# Patient Record
Sex: FEMALE | Race: WHITE | NOT HISPANIC OR LATINO | Employment: FULL TIME | URBAN - NONMETROPOLITAN AREA
[De-identification: names, ages, dates, MRNs, and addresses within clinical notes are randomized per-mention and may not be internally consistent; named-entity substitution may affect disease eponyms.]

---

## 2020-08-21 PROBLEM — K60.30 ANAL FISTULA: Status: ACTIVE | Noted: 2020-08-21

## 2022-03-30 PROBLEM — K60.30 ANAL FISTULA: Status: RESOLVED | Noted: 2020-08-21 | Resolved: 2022-03-30

## 2022-12-12 DIAGNOSIS — Z30.41 ENCOUNTER FOR SURVEILLANCE OF CONTRACEPTIVE PILLS: Primary | ICD-10-CM

## 2022-12-12 RX ORDER — ACETAMINOPHEN AND CODEINE PHOSPHATE 120; 12 MG/5ML; MG/5ML
1 SOLUTION ORAL DAILY
Qty: 90 TABLET | Refills: 3 | Status: SHIPPED | OUTPATIENT
Start: 2022-12-12

## 2022-12-12 RX ORDER — NORGESTIMATE AND ETHINYL ESTRADIOL 0.25-0.035
1 KIT ORAL DAILY
Qty: 90 TABLET | Refills: 3 | Status: SHIPPED | OUTPATIENT
Start: 2022-12-12 | End: 2022-12-12

## 2023-07-05 ENCOUNTER — HOSPITAL ENCOUNTER (OUTPATIENT)
Dept: RADIOLOGY | Facility: CLINIC | Age: 33
Discharge: HOME | End: 2023-07-05
Payer: COMMERCIAL

## 2023-07-05 ENCOUNTER — OFFICE VISIT (OUTPATIENT)
Dept: OBGYN CLINIC | Facility: CLINIC | Age: 33
End: 2023-07-05
Payer: COMMERCIAL

## 2023-07-05 VITALS
HEART RATE: 76 BPM | TEMPERATURE: 97.8 F | WEIGHT: 188 LBS | HEIGHT: 65 IN | SYSTOLIC BLOOD PRESSURE: 126 MMHG | BODY MASS INDEX: 31.32 KG/M2 | DIASTOLIC BLOOD PRESSURE: 78 MMHG

## 2023-07-05 DIAGNOSIS — S82.035A CLOSED NONDISPLACED TRANSVERSE FRACTURE OF LEFT PATELLA, INITIAL ENCOUNTER: ICD-10-CM

## 2023-07-05 DIAGNOSIS — M25.562 ACUTE PAIN OF LEFT KNEE: ICD-10-CM

## 2023-07-05 DIAGNOSIS — M25.562 ACUTE PAIN OF LEFT KNEE: Primary | ICD-10-CM

## 2023-07-05 PROCEDURE — 99204 OFFICE O/P NEW MOD 45 MIN: CPT | Performed by: ORTHOPAEDIC SURGERY

## 2023-07-05 PROCEDURE — 73560 X-RAY EXAM OF KNEE 1 OR 2: CPT

## 2023-07-05 PROCEDURE — 27520 TREAT KNEECAP FRACTURE: CPT | Performed by: ORTHOPAEDIC SURGERY

## 2023-07-05 NOTE — PROGRESS NOTES
ASSESSMENT/PLAN:    Diagnoses and all orders for this visit:    Acute pain of left knee  -     XR knee 1 or 2 vw left; Future    Closed nondisplaced transverse fracture of left patella, initial encounter  -     T-Rom  Post Op Knee Brace        Plan: A T ROM brace was provided allowing her approximately 30 degrees of flexion. She is permitted to bear weight but the brace should remain in place at all times. She was provided with a note for work limiting her activities. I will see her in 2 weeks for reevaluation with AP and lateral x-rays of her left knee obtained out of the brace. If the fracture remains nondisplaced and early healing is noted, slightly greater range of motion may be permitted and she may be able to come out of the brace for brief periods of time, such as cleansing. However, she does understand that removing the brace might increase the risk of fracture displacement, especially if she will fall. Instructions were provided, questions answered and precautions reviewed. Return in about 2 weeks (around 7/19/2023). _____________________________________________________  CHIEF COMPLAINT:  Chief Complaint   Patient presents with   • Left Knee - Fracture         SUBJECTIVE:  Kulwinder Eduardo is a 35y.o. year old female who presents for evaluation of her left knee injured when she fell getting out of the bathtub on 6/29/2023. She describes her left leg slipping and she fell landing on her left knee in a kneeling type position. She was seen in the emergency room at [facility], x-rays were obtained and she was placed into a knee immobilizer. She has removed the knee immobilizer for sleeping and for cleansing. She will keep the knee immobilizer off when she is initially getting ready for the days activities in the morning and when getting ready for bed. She notes pain if she tries to flex the knee. She denies any history of prior injuries to the left knee.   She denies additional injuries and denies paresthesias. She complains of anterior knee pain. PAST MEDICAL HISTORY:  Past Medical History:   Diagnosis Date   • Arthritis    • Borderline high cholesterol    • Collagen disorder (HCC)     arthritis   • CTS (carpal tunnel syndrome)    • GERD (gastroesophageal reflux disease)    • IBS (irritable bowel syndrome)    • Urinary tract infection    • Varicella        PAST SURGICAL HISTORY:  Past Surgical History:   Procedure Laterality Date   • ADENOIDECTOMY     • ANAL FISTULOTOMY  2021   • COLONOSCOPY     • HEMORRHOID SURGERY  10/30/2020   • [place]  DELIVERY ONLY N/A 10/4/2022    Procedure:  SECTION ();   Surgeon: Jennifer Cruz DO;  Location: [facility];  Service: Obstetrics   • UPPER GASTROINTESTINAL ENDOSCOPY     • WISDOM TOOTH EXTRACTION         FAMILY HISTORY:  Family History   Problem Relation Age of Onset   • Ulcerative colitis Paternal Grandmother    • Diverticulitis Paternal Grandmother    • Colon polyps Maternal Aunt    • Colon cancer Other    • Supraventricular tachycardia Mother    • Other Mother         trali antibody   • Hyperlipidemia Father    • Arthritis Father    • Hypertension Father    • Hypertension Maternal Grandmother    • Heart attack Maternal Grandfather    • Colonic polyp Maternal Grandfather    • Liver disease Maternal Grandfather    • Heart disease Maternal Grandfather    • Dementia Paternal Grandfather    • No Known Problems Son    • Breast cancer Neg Hx    • Ovarian cancer Neg Hx        SOCIAL HISTORY:  Social History     Tobacco Use   • Smoking status: Former     Packs/day: 1.00     Years: 0.00     Total pack years: 0.00     Types: Cigarettes     Quit date: 3/10/2019     Years since quittin.3   • Smokeless tobacco: Never   Vaping Use   • Vaping Use: Never used   Substance Use Topics   • Alcohol use: Yes     Comment: socially prior to knowledge of pregnancy   • Drug use: Never       MEDICATIONS:    Current Outpatient Medications:   •  norethindrone (Jill) 0.35 MG tablet, Take 1 tablet (0.35 mg total) by mouth daily, Disp: 90 tablet, Rfl: 3  •  etonogestrel-ethinyl estradiol (NuvaRing) 0.12-0.015 MG/24HR vaginal ring, Insert vaginally and leave in place for 3 consecutive weeks, then remove for 1 week. (Patient not taking: Reported on 7/5/2023), Disp: 3 each, Rfl: 3  •  Prenatal Vit-Fe Fumarate-FA (PRENATAL VITAMIN PO), Take by mouth (Patient not taking: Reported on 7/5/2023), Disp: , Rfl:     ALLERGIES:  Allergies   Allergen Reactions   • Pollen Extract Swelling     Tree Pollen-Watery eyes, swollen glands       Review of systems:   Constitutional: Negative for fatigue, fever or loss of apetite. HENT: Negative. Respiratory: Negative for shortness of breath, dyspnea. Cardiovascular: Negative for chest pain/tightness. Gastrointestinal: Negative for abdominal pain, N/V. Endocrine: Negative for cold/heat intolerance, unexplained weight loss/gain. Genitourinary: Negative for flank pain, dysuria, hematuria. Musculoskeletal: Positive as in the HPI  Skin: Negative for rash. Neurological: Negative  Psychiatric/Behavioral: Negative for agitation. _____________________________________________________  PHYSICAL EXAMINATION:    Blood pressure 126/78, pulse 76, temperature 97.8 °F (36.6 °C), temperature source Temporal, height 5' 5" (1.651 m), weight 85.3 kg (188 lb), last menstrual period 05/24/2023, not currently breastfeeding. General: well developed and well nourished, alert, oriented times 3 and appears comfortable  Psychiatric: Normal  HEENT: Benign  Cardiovascular: Regular    Pulmonary: No wheezing or stridor  Abdomen: Soft, Nontender  Skin: No masses, erythema, lacerations, fluctation, ulcerations  Neurovascular: Motor and sensory exams are grossly intact excluding as is limited by her injury. Pulses are palpable. MUSCULOSKELETAL EXAMINATION:    Lower extremity exam demonstrates knee immobilizer in place. She has good range of motion of the hip without complaints, likewise, she has good range of motion of the ankle and foot without complaints. The immobilizer was removed. There is resolving ecchymosis noted about the anterior knee and tenderness to palpation of the distal portion of the patella. She denied tenderness with palpation more proximally along the mid to proximal portion of the patella and with medial/lateral compression of the patella. The patella tendon and tibial tubercle were nontender. There is a mild effusion noted. She was able to be flexed to approximately 45 degrees with pain imaging further flexion. Collateral ligaments are stable. Meniscal examination could not be completed due to the patient's symptoms. _____________________________________________________  STUDIES REVIEWED:  X-rays of the knee dated 6/29/2023 demonstrate a distal pole patella fracture seen only on the lateral image without significant displacement. Repeat x-rays obtained today including AP and lateral views of the knee demonstrate the fracture to remain in acceptable position without change from the original images. The x-ray report of 6/29/2023 was reviewed. The ER note was reviewed. PROCEDURES:  Fracture / Dislocation Treatment    Date/Time: 7/5/2023 5:00 PM    Performed by: Reba Mortimer  Authorized by: Reba Mortimer    Patient Location:  [facility]  [place] Protocol:  Consent: Verbal consent obtained. Written consent not obtained. Risks and benefits: risks, benefits and alternatives were discussed  Consent given by: patient  Patient understanding: patient states understanding of the procedure being performed  Test results: test results available and properly labeled  Radiology Images displayed and confirmed.  If images not available, report reviewed: imaging studies available      Injury location:  Knee  Location details:  Left knee  Injury type:  Fracture  Fracture type: patellar    Neurovascular status: Neurovascularly intact    Local anesthesia used?: No    General anesthesia used?: No    Manipulation performed?: No    Immobilization:  Brace (T ROM)  Patient tolerance:  Patient tolerated the procedure well with no immediate complications          Nile Maldonado

## 2023-07-05 NOTE — LETTER
July 5, 2023     Patient: Meg Delgado  YOB: 1990  Date of Visit: 7/5/2023      To Whom it May Concern:    Meg Delgado is under my professional care. Bladimir Samaniego was seen in my office on 7/5/2023. Bladimir Samanieog may return to work on 7/5/2023. She is permitted to work but must wear the knee brace at all times. She is not permitted to lift more than 10 pounds. She is permitted to perform routine clerical duties . If you have any questions or concerns, please don't hesitate to call.          Sincerely,          Ariadne Alvarez        CC: Cheryle Harris

## 2023-07-10 ENCOUNTER — TELEPHONE (OUTPATIENT)
Dept: OBGYN CLINIC | Facility: HOSPITAL | Age: 33
End: 2023-07-10

## 2023-07-10 NOTE — TELEPHONE ENCOUNTER
Caller: patient  Doctor: Drea Sol    Reason for call: has pain from brace scraping skin, asking if she can cushion it with something?     Call back#: [phone]

## 2023-07-14 NOTE — TELEPHONE ENCOUNTER
Patient came into the office to have straps adjusted. She said that she had to take brace off to put a Band-Aid on an area that brace was rubbing on. She also put knee sleeve on to prevent rubbing. Brace adjusted and put back on without knee sleeve  I made sure that patient was comfortable with straps and padding before she left the office.

## 2023-07-18 NOTE — TELEPHONE ENCOUNTER
Another message received from patient about brace. Duarte Officer came to office today to check brace. She exchanged brace with a new one and added some moleskin to strap that was irritating patients skin.

## 2023-07-20 ENCOUNTER — OFFICE VISIT (OUTPATIENT)
Dept: OBGYN CLINIC | Facility: CLINIC | Age: 33
End: 2023-07-20

## 2023-07-20 ENCOUNTER — HOSPITAL ENCOUNTER (OUTPATIENT)
Dept: RADIOLOGY | Facility: CLINIC | Age: 33
End: 2023-07-20
Payer: COMMERCIAL

## 2023-07-20 VITALS
WEIGHT: 190 LBS | HEART RATE: 73 BPM | SYSTOLIC BLOOD PRESSURE: 122 MMHG | BODY MASS INDEX: 31.65 KG/M2 | DIASTOLIC BLOOD PRESSURE: 80 MMHG | TEMPERATURE: 98.2 F | HEIGHT: 65 IN

## 2023-07-20 DIAGNOSIS — S82.035D CLOSED NONDISPLACED TRANSVERSE FRACTURE OF LEFT PATELLA WITH ROUTINE HEALING, SUBSEQUENT ENCOUNTER: ICD-10-CM

## 2023-07-20 DIAGNOSIS — S82.035D CLOSED NONDISPLACED TRANSVERSE FRACTURE OF LEFT PATELLA WITH ROUTINE HEALING, SUBSEQUENT ENCOUNTER: Primary | ICD-10-CM

## 2023-07-20 PROCEDURE — 73562 X-RAY EXAM OF KNEE 3: CPT

## 2023-07-20 PROCEDURE — 99024 POSTOP FOLLOW-UP VISIT: CPT | Performed by: ORTHOPAEDIC SURGERY

## 2023-07-20 NOTE — PROGRESS NOTES
Patient Name:  Alejandra Malik  MRN:  [id number]    Assessment     1. Closed nondisplaced transverse fracture of left patella with routine healing, subsequent encounter  XR knee 3 vw left non injury        Plan   Patient will continue in the TROM brace. It is to be locked in extension for weightbearing. She can unlock it to work on range of motion 0 to 40 degrees. Reviewed how to shower getting in with brace on and then removing, turning water on/showering, drying and reapplying brace prior to exiting the shower. Patient will be seen back in 3 weeks with repeat AP and lateral nonweightbearing x-rays out of brace and likely start physical therapy at that point in time. She was shown some straight leg raises with straight leg extension, and abduction/adduction exercises that can be done in the brace locked in extension. She is also to work on gentle range of motion when 0 and 40 degrees in brace at home. Ice as needed for discomfort. Return in about 3 weeks (around 8/10/2023) for AP and lateral x-ray. Subjective   Alejandra Malik returns for follow-up of left nondisplaced transverse fracture inferior pole patella with DOI 6/29/2023 while at home getting out of the bathtub. The patient is 3 week(s) post injury and returns for routine follow-up. Patient denies major pain about the knee. He feels better with less brace irritation and the new brace that she got fitted by and that. She does note slight soreness if weightbearing for extended periods of time. She does note that she gets some pain way she attempts to ambulate with the brace not locked in extension. Objective     /80 (BP Location: Left arm)   Pulse 73   Temp 98.2 °F (36.8 °C) (Temporal)   Ht 5' 5" (1.651 m)   Wt 86.2 kg (190 lb)   LMP 05/24/2023 (Exact Date)   BMI 31.62 kg/m²     Left knee is without ecchymosis and only minimal effusion.   Very minimal tenderness with palpation at the fracture site on the inferior patella. Brace was locked in full extension with settings at 0 and 0 other than 0-30. She had pain-free flexion to 40 degrees and therefore the brace was set at 0-40. There is no calf pain. There is a small half a centimeter round area of very superficial abrasion in the brace strap that is not infected and healing nicely. Light touch sensation is intact. Data Review     I have personally reviewed pertinent films in PACS documenting notes contained position alignment of the nondisplaced inferior pole patella fracture.     [place] [employer]

## 2023-07-20 NOTE — PATIENT INSTRUCTIONS
Patient will continue in the TROM brace. It is to be locked in extension for weightbearing. She can unlock it to work on range of motion 0 to 40 degrees. Reviewed how to shower getting in with brace on and then removing, turning water on/showering, drying and reapplying brace prior to exiting the shower. Patient will be seen back in 3 weeks with repeat x-rays and likely start physical therapy at that point in time. She was shown some straight leg raises with straight leg extension, and abduction/adduction exercises that can be done in the brace locked in extension. She is also to work on gentle range of motion when 0 and 40 degrees in brace at home. Ice as needed for discomfort.

## 2023-07-28 ENCOUNTER — OFFICE VISIT (OUTPATIENT)
Dept: OBGYN CLINIC | Facility: CLINIC | Age: 33
End: 2023-07-28

## 2023-07-28 VITALS
SYSTOLIC BLOOD PRESSURE: 120 MMHG | HEIGHT: 65 IN | BODY MASS INDEX: 31.65 KG/M2 | WEIGHT: 190 LBS | HEART RATE: 110 BPM | TEMPERATURE: 98.6 F | DIASTOLIC BLOOD PRESSURE: 80 MMHG

## 2023-07-28 DIAGNOSIS — S82.035D CLOSED NONDISPLACED TRANSVERSE FRACTURE OF LEFT PATELLA WITH ROUTINE HEALING, SUBSEQUENT ENCOUNTER: Primary | ICD-10-CM

## 2023-07-28 PROCEDURE — 99024 POSTOP FOLLOW-UP VISIT: CPT | Performed by: ORTHOPAEDIC SURGERY

## 2023-07-28 NOTE — PATIENT INSTRUCTIONS
Patient will follow-up at her previously scheduled appointment. Continue 0 to 40 degrees range of motion and bracing as previous. Ice for any pain or swelling.

## 2023-07-28 NOTE — PROGRESS NOTES
Patient Name:  Ventura Aguilar  MRN:  [id number]    Assessment     1. Closed nondisplaced transverse fracture of left patella with routine healing, subsequent encounter          Plan     1. Patient will follow-up at her previously scheduled appointment. Continue 0 to 40 degrees range of motion and bracing as previous. Ice for any pain or swelling. Return Keep your previously scheduled follow-up. Subjective   [place] Selina Gonzales returns because she thought she saw a dimple change in her knee this morning slightly more pain. She did not fall. She was at the [employer] last evening. You had a  she worked this morning. Denies numbness or tingling. She notes that her contralateral hip is irritating little bit to the way she is walking. Objective     /80 (BP Location: Left arm)   Pulse (!) 110   Temp 98.6 °F (37 °C) (Temporal)   Ht 5' 5" (1.651 m)   Wt 86.2 kg (190 lb)   BMI 31.62 kg/m²     Left knee doubt any obvious deformity there is trace swelling still present over the medial aspect. No ecchymosis. No change in position of the patella and no obvious patellar step-off nor Sessa pain to palpation. Touch sensation intact. On adjusting the brace patient is able to do a straight leg raise remove the brace from underneath the leg without pain. Patient was walking stiff legged in the office today. Brace was adjusted and ankle notations calibrated following the patient to have 0 to 40 degrees which she is able to tolerate and notes better mobility. Data Review     No pertinent data or x-rays for review.     Tamy Owens PA-C

## 2023-07-28 NOTE — PROGRESS NOTES
Patient Name:  Cranston Moritz  MRN:  [id number]    Assessment     No diagnosis found. Plan     1. ***    No follow-ups on file. Subjective   Cranston Moritz returns for follow-up of left nondisplaced transverse fracture of inferior pole of patella DOI 6/29/2023. The patient is 4 week(s) post injury and returns for routine follow-up. She was last seen in regards to this issue on 7/20/2023, at which time she was instructed to continue with a T ROM knee brace. She can unlock it and practice range of motion exercises between 0 and 40 degrees. She was also educated in appropriate removing and replacing techniques of the brace for hygiene purposes, and four-way hip exercises to be performed in the brace. On today's presentation patient {Denies/complains:58366}      Objective     /80 (BP Location: Left arm)   Pulse (!) 110   Temp 98.6 °F (37 °C) (Temporal)   Ht 5' 5" (1.651 m)   Wt 86.2 kg (190 lb)   BMI 31.62 kg/m²     Left knee -         Data Review     I have personally reviewed pertinent films in PACS, and my interpretation follows.     .  Scribe Attestation    I,:  Valencia Burris am acting as a scribe while in the presence of the attending physician.:       I,:  Katie Swartz personally performed the services described in this documentation    as scribed in my presence.:

## 2023-08-02 ENCOUNTER — PATIENT MESSAGE (OUTPATIENT)
Dept: OBGYN CLINIC | Facility: MEDICAL CENTER | Age: 33
End: 2023-08-02

## 2023-08-02 DIAGNOSIS — Z30.011 ENCOUNTER FOR INITIAL PRESCRIPTION OF CONTRACEPTIVE PILLS: Primary | ICD-10-CM

## 2023-08-06 RX ORDER — NORGESTIMATE AND ETHINYL ESTRADIOL 0.25-0.035
1 KIT ORAL DAILY
Qty: 90 TABLET | Refills: 3 | Status: SHIPPED | OUTPATIENT
Start: 2023-08-06

## 2023-08-10 ENCOUNTER — OFFICE VISIT (OUTPATIENT)
Dept: OBGYN CLINIC | Facility: CLINIC | Age: 33
End: 2023-08-10

## 2023-08-10 ENCOUNTER — HOSPITAL ENCOUNTER (OUTPATIENT)
Dept: RADIOLOGY | Facility: CLINIC | Age: 33
Discharge: HOME | End: 2023-08-10
Payer: COMMERCIAL

## 2023-08-10 VITALS
SYSTOLIC BLOOD PRESSURE: 110 MMHG | WEIGHT: 190 LBS | HEIGHT: 65 IN | TEMPERATURE: 98 F | HEART RATE: 80 BPM | DIASTOLIC BLOOD PRESSURE: 72 MMHG | BODY MASS INDEX: 31.65 KG/M2

## 2023-08-10 DIAGNOSIS — S82.035D CLOSED NONDISPLACED TRANSVERSE FRACTURE OF LEFT PATELLA WITH ROUTINE HEALING, SUBSEQUENT ENCOUNTER: Primary | ICD-10-CM

## 2023-08-10 DIAGNOSIS — S82.035D CLOSED NONDISPLACED TRANSVERSE FRACTURE OF LEFT PATELLA WITH ROUTINE HEALING, SUBSEQUENT ENCOUNTER: ICD-10-CM

## 2023-08-10 PROCEDURE — 73560 X-RAY EXAM OF KNEE 1 OR 2: CPT

## 2023-08-10 PROCEDURE — 99024 POSTOP FOLLOW-UP VISIT: CPT | Performed by: ORTHOPAEDIC SURGERY

## 2023-08-10 NOTE — PROGRESS NOTES
Patient Name:  Alcides Brown  MRN:  [id number]    Assessment     1. Closed nondisplaced transverse fracture of left patella with routine healing, subsequent encounter  XR knee 1 or 2 vw left    Ambulatory Referral to Physical Therapy          Plan     1. The range of motion brace was increased to allow 0 to 90 degrees. She may remove the brace for inactivity, sleeping and cleansing. The option of physical therapy was discussed and she does believe that therapy would be in her best interest.  A prescription was provided for her to attend physical therapy for the next 3 weeks and I will see her in 3 weeks for reevaluation. X-rays will be obtained only if clinically indicated. Pending her physical therapy progress, further physical therapy will be ordered if need be. I have encouraged her to contact me if questions or concerns arise. Return in about 3 weeks (around 8/31/2023). Subjective   Alcides Brown returns for follow-up of her left patella fracture. The patient is 6 week(s) post injury and returns for routine follow-up. Patient denies complaints at this time. She has been tolerating the brace. Objective     /72 (BP Location: Left arm)   Pulse 80   Temp 98 °F (36.7 °C) (Temporal)   Ht 5' 5" (1.651 m)   Wt 86.2 kg (190 lb)   BMI 31.62 kg/m²     Exam demonstrates the brace in place. She has no tenderness to palpation of the patella. She does have excellent strength of resisted extension but did complain of minor pain at the distal pole of the left patella. There is no significant swelling noted. The skin is intact. Distal pulses are palpable. Data Review     I have personally reviewed pertinent films in PACS demonstrating excellent progression of healing with the fracture being barely visible, especially in comparison to images of 7/20/2023.     [place]

## 2023-08-11 NOTE — PROGRESS NOTES
PT Evaluation     Today's date: 2023  Patient name: Kizzy López  : 1990  MRN: [id number]  Referring provider: Shari Burr DO  Dx:   Encounter Diagnosis     ICD-10-CM    1. Closed nondisplaced transverse fracture of left patella with routine healing, subsequent encounter  S82.035D                      Assessment  Assessment details: The patient is a 34 y/o female who presents to PT with diagnosis of closed L patella fracture. She has complaints of intermittent knee pain. She demonstrates deficits with decreased ROM and strength, decreased flexibility, antalgic gait with use of brace, decreased balance and proprioception, difficulty with stair negotiation, muscle atrophy and pain with completing her ADLs and tasks at home. She is ambulating without AD but is using brace open from 0-90*. She ambulates with slow dolores and decreased weight shift to LLE in stance phase. She has more difficulty with walking on uneven surfaces vs even surfaces and also has complaints of knee instability at times. She has difficulty with stair negotiation and has been doing them with non-reciprocal gait pattern. Muscle atrophy is noted in quad and calf L vs R LE. Secondary to pain and above deficits she is limited with her overall mobility and function. The patient would benefit from continued PT to address deficits and improve function. Tx to include ROM, stretching, strengthening, modalities, HEP, pt education, postural ed, lifting/body mechanics, neuro re-ed, balance/proprioception Te, MT and equipment. Impairments: abnormal gait, abnormal or restricted ROM, activity intolerance, impaired balance, impaired physical strength, lacks appropriate home exercise program, pain with function and weight-bearing intolerance  Other impairment: decreased flexibility  Functional limitations: difficulty with stair negotiationUnderstanding of Dx/Px/POC: good   Prognosis: good    Goals  STGs:  1.   Initiate and complete HEP with verbal cues. 2.  Decrease L knee pain by > 25% in 4 weeks. 3.  Improve L knee ROM by 15-20 degrees in 4 weeks. 4.  Improve LLE strength by 1/2-1 grade in 4 weeks. LTGs:  1. Patient to be I with HEP in 8 weeks. 2. Improve L knee ROM to 0-140 degrees in 8 weeks to improve function. 3. Improve LLE strength to 4+ to 5/5 t/o in 8 weeks to improve function. 4. Decrease L knee pain to < or = to 1-2/10 with activity in 8 weeks to improve function. 5.  Patient to ambulate with normalized gait pattern without brace in 8 weeks. 6.  Stair negotiation is improved to PLOF in 8 weeks. 7.  Recreational performance is improved to PLOF in 8 weeks. 8.  ADL performance is improved to PLOF in 8 weeks. 9.  Work performance is improved to maximal level of function in 8 weeks. 10.  Decrease edema  L knee by > 1-2 cm in 8 weeks to help improve flexibility. 11.  Patient to report no knee instability in 8 weeks. Plan  Plan details: Modalities and therapy interventions prn. Patient would benefit from: skilled physical therapy  Planned modality interventions: cryotherapy and thermotherapy: hydrocollator packs  Planned therapy interventions: IASTM, manual therapy, balance, balance/weight bearing training, neuromuscular re-education, patient education, postural training, self care, strengthening, therapeutic activities, stretching, therapeutic exercise, gait training, functional ROM exercises, flexibility and home exercise program  Frequency: 2x week  Duration in weeks: 8  Plan of Care beginning date: 8/16/2023  Plan of Care expiration date: 10/11/2023  Treatment plan discussed with: patient        Subjective Evaluation    History of Present Illness  Date of onset: 6/29/2023  Mechanism of injury: trauma  Mechanism of injury: The patient states that on 6/29 she was in her bathroom and slipped on water. She had landed on her L knee cap when she fell.   She notes that she had pain in her knee and was limping. She had gone to [facility] [place] [facility]. She had x-rays taken, she had a fracture of the patella. She was given an immobilizer to wear. She was told to follow up with the orthopedic doctor. She had seen Dr. Adonay Morrow on 23. She was taken out of the immobilizer and given a hinged knee brace. She was told that she could open it to 40* but she had kept it locked because her knee felt unstable. She was back to see the doctor on , had more x-rays taken. She had seen Dr. Adonay Morrow on 8/10 for her most recent visit and was referred to OPPT. She had more x-rays taken, per patient it is healing. She was able to open her brace to 90*. She will be going back to see him on  for her follow up appointment. She notes that her knee is stiff. Knee continues to feel unstable, especially with the steps and uneven surfaces. She notes that she has pain in her knee and along her posterior knee (hamstring tendons). Also with in her knee when straightening her knee. She notes that she does get intermittent swelling in her knee. From EMR Review of knee x-rays in Dr. Adonay Morrow note:  I have personally reviewed pertinent films in PACS demonstrating excellent progression of healing with the fracture being barely visible, especially in comparison to images of 2023. Patient Goals  Patient goals for therapy: increased motion, improved balance, decreased pain, increased strength, decreased edema, return to work and return to sport/leisure activities  Patient goal: "To get my knee stronger, to feel confident when lifting at work."    Pain  At best pain ratin  At worst pain ratin  Location: L Knee  Quality: dull ache and discomfort  Aggravating factors: stair climbing and walking    Social Support  Stairs in house: yes   Patient lives at: [facility].   Lives with: young children and spouse    Employment status: working (Working full time -  )    Diagnostic Tests  Abnormal x-ray: patella is healing         Objective     Observations     Additional Observation Details  Muscle atrophy noted in L quad and calf vs RLE. Tenderness   Left Knee   Tenderness in the inferior patella and patellar tendon. No tenderness in the lateral patella, medial patella and quadriceps tendon. Neurological Testing     Sensation     Knee   Left Knee   Intact: light touch    Right Knee   Intact: light touch     Active Range of Motion   Left Knee   Flexion: 90 (Able to bend to 105 with pulling) degrees   Extension: 0 degrees     Right Knee   Flexion: 140 degrees   Extension: 0 degrees     Mobility   Patellar Mobility:   Left Knee   WFL: medial and lateral.     Strength/Myotome Testing     Left Knee   Flexion: 3-  Extension: 3-  Quadriceps contraction: fair    Right Knee   Flexion: WFL  Extension: WFL  Quadriceps contraction: good    Swelling     Left Knee Girth Measurement (cm)   Joint line: 40 cm    Right Knee Girth Measurement (cm)   Joint line: 38.5 cm    Ambulation   Weight-Bearing Status   Weight-Bearing Status (Left): weight-bearing as tolerated     Additional Weight-Bearing Status Details  Using hinged knee brace open 0-90* for activity. Ambulation: Level Surfaces   Ambulation without assistive device: independent    Ambulation: Stairs   Ascend stairs: independent  Pattern: non-reciprocal  Descend stairs: independent  Pattern: non-reciprocal    Observational Gait   Decreased walking speed and left stance time.                  Precautions: Knee ROM 0-90 degrees, Arthritis       Manuals 8/16       L Knee        LLE                        Neuro Re-Ed         BOSU Lunges        SLS        Tandem Stance        Sidestepping        Monster Walks                        Ther Ex        NuStep vs Bike        HR/TR HEP - HR       SLR x 3        TKE w/TBand        LAQ HEP       QSets HEP       SAQ        Bridges        SLR HEP       Heel Slides to 90*        S/L Hip Abd HEP       Clamshells        Prone Hip Ext Ther Activity        Stepups F/L HEP       Stepdowns        STS HEP       Gait Training                        Modalities        CP prn                           Access Code: [email or web address]  URL: [email or web address]  Date: 08/16/2023  Prepared by: [address] [place] [address] Sitting Quad Set  - 1 x daily - 7 x weekly - 2 sets - 10 reps - 3-5" hold  - Supine Active Straight Leg Raise  - 1 x daily - 7 x weekly - 2 sets - 10 reps  - Sidelying Hip Abduction  - 1 x daily - 7 x weekly - 2 sets - 10 reps  - Seated Long Arc Quad  - 1 x daily - 7 x weekly - 2 sets - 10 reps - 3"  hold  - Standing Heel Raise with Support  - 1 x daily - 7 x weekly - 2 sets - 10 reps  - Sit to Stand  - 1 x daily - 7 x weekly - 1 sets - 10 reps  - Forward Step Up  - 1 x daily - 7 x weekly - 1 sets - 10 reps

## 2023-08-16 ENCOUNTER — EVALUATION (OUTPATIENT)
Dept: PHYSICAL THERAPY | Facility: CLINIC | Age: 33
End: 2023-08-16
Payer: COMMERCIAL

## 2023-08-16 DIAGNOSIS — S82.035D CLOSED NONDISPLACED TRANSVERSE FRACTURE OF LEFT PATELLA WITH ROUTINE HEALING, SUBSEQUENT ENCOUNTER: Primary | ICD-10-CM

## 2023-08-16 PROCEDURE — 97161 PT EVAL LOW COMPLEX 20 MIN: CPT | Performed by: PHYSICAL THERAPIST

## 2023-08-16 PROCEDURE — 97535 SELF CARE MNGMENT TRAINING: CPT | Performed by: PHYSICAL THERAPIST

## 2023-08-19 NOTE — PROGRESS NOTES
Daily Note     Today's date: 2023  Patient name: Alysa Vaughan  : 1990  MRN: [id number]  Referring provider: Amisha Ziegler DO  Dx:   Encounter Diagnosis     ICD-10-CM    1. Closed nondisplaced transverse fracture of left patella with routine healing, subsequent encounter  S82.035D       2. Gait abnormality  R26.9                      Subjective:  Patient reports she is compliant with HEP but feels the left thigh is very weak with difficulty with standing, walking and transfers. Objective: See treatment diary below      Assessment: Tolerated treatment well. PT notes start of POC with focus on gait/balance and strengthening to decrease symptoms and improve functional limitations to meet therapy goals. PT notes continuation of decrease ROM and strength t/o the left knee and LE with demonstration of impaired gait pattern and balance s/p closed patellar fracture with need for continuation of skilled therapy. Plan: Continue per plan of care.         Precautions: Knee ROM 0-90 degrees, Arthritis       Manuals       L Knee  5 min       LLE  5 min                       Neuro Re-Ed         BOSU Lunges  F/L Lunge   2x10 Each Bilat LE       SLS        Tandem Stance        Sidestepping        Monster Walks                        Ther Ex        NuStep vs Bike  SRC   5 min   Half Rolls       HR/TR HEP - HR       SLR x 3  2x10 Each left Only       TKE w/TBand        LAQ HEP       QSets HEP       SAQ        Bridges        SLR HEP       Heel Slides to 90*        S/L Hip Abd HEP       Clamshells        Prone Hip Ext  Prone TKE   10x5" Hold               Ther Activity        Stepups F/L HEP       Stepdowns        STS HEP       Gait Training                        Modalities        CP prn  Declined

## 2023-08-21 ENCOUNTER — OFFICE VISIT (OUTPATIENT)
Dept: PHYSICAL THERAPY | Facility: CLINIC | Age: 33
End: 2023-08-21
Payer: COMMERCIAL

## 2023-08-21 DIAGNOSIS — S82.035D CLOSED NONDISPLACED TRANSVERSE FRACTURE OF LEFT PATELLA WITH ROUTINE HEALING, SUBSEQUENT ENCOUNTER: Primary | ICD-10-CM

## 2023-08-21 DIAGNOSIS — R26.9 GAIT ABNORMALITY: ICD-10-CM

## 2023-08-21 PROCEDURE — 97140 MANUAL THERAPY 1/> REGIONS: CPT | Performed by: PHYSICAL THERAPIST

## 2023-08-21 PROCEDURE — 97110 THERAPEUTIC EXERCISES: CPT | Performed by: PHYSICAL THERAPIST

## 2023-08-21 PROCEDURE — 97112 NEUROMUSCULAR REEDUCATION: CPT | Performed by: PHYSICAL THERAPIST

## 2023-08-22 NOTE — PROGRESS NOTES
Daily Note     Today's date: 2023  Patient name: Lore Cazares  : 1990  MRN: [id number]  Referring provider: Nena Christy DO  Dx:   Encounter Diagnosis     ICD-10-CM    1. Closed nondisplaced transverse fracture of left patella with routine healing, subsequent encounter  S82.035D       2. Gait abnormality  R26.9                      Subjective:  Patient reports minimal soreness in the left knee post last session. Patient reports the knee is still shaking and feels weak with standing and walking activities. Objective: See treatment diary below      Assessment: Tolerated treatment well. PT notes continuation of decrease ROM and strength t/o the left knee and LE with demonstration of impaired gait pattern and balance s/p left patellar fracture with need for continuation of skilled therapy. Plan: Continue per plan of care.       Precautions: Knee ROM 0-90 degrees, Arthritis       Manuals      L Knee  5 min  5 min      LLE  5 min  5 min                      Neuro Re-Ed         BOSU Lunges  F/L Lunge   2x10 Each Bilat LE  F/L Lunge   2x10 Each   Bilat LE      SLS        Tandem Stance      2 min      Sidestepping        Monster Walks                        Ther Ex        NuStep vs Bike  [facility]   5 min   Half Circles   [facility]   6 min Half Circles      HR/TR HEP - HR  On Step   2x10 Bilat   6" step      SLR x 3  2x10 Each left Only  HEP      TKE w/TBand   10x5" Hold Blue      LAQ HEP       QSets HEP       SAQ        Bridges   With ABD   2x10 Blue      SLR HEP       Heel Slides to 90*        S/L Hip Abd HEP       Clamshells        Prone Hip Ext  Prone TKE   10x5" Hold  Prone TKE 10x5" Hold              Ther Activity        Stepups F/L   2x10 Bilat LE   6" step      Stepdowns        STS HEP       Gait Training                        Modalities        CP prn  Declined  Declined

## 2023-08-23 ENCOUNTER — OFFICE VISIT (OUTPATIENT)
Dept: PHYSICAL THERAPY | Facility: CLINIC | Age: 33
End: 2023-08-23
Payer: COMMERCIAL

## 2023-08-23 DIAGNOSIS — R26.9 GAIT ABNORMALITY: ICD-10-CM

## 2023-08-23 DIAGNOSIS — S82.035D CLOSED NONDISPLACED TRANSVERSE FRACTURE OF LEFT PATELLA WITH ROUTINE HEALING, SUBSEQUENT ENCOUNTER: Primary | ICD-10-CM

## 2023-08-23 PROCEDURE — 97110 THERAPEUTIC EXERCISES: CPT | Performed by: PHYSICAL THERAPIST

## 2023-08-23 PROCEDURE — 97140 MANUAL THERAPY 1/> REGIONS: CPT | Performed by: PHYSICAL THERAPIST

## 2023-08-23 PROCEDURE — 97112 NEUROMUSCULAR REEDUCATION: CPT | Performed by: PHYSICAL THERAPIST

## 2023-08-25 NOTE — PROGRESS NOTES
Daily Note     Today's date: 2023  Patient name: Johana Haney  : 1990  MRN: [id number]  Referring provider: Yaquelin Patrick DO  Dx:   Encounter Diagnosis     ICD-10-CM    1. Closed nondisplaced transverse fracture of left patella with routine healing, subsequent encounter  S82.035D       2. Gait abnormality  R26.9                      Subjective:  Patient reports the knee is moving better but continuation of weakness and shakiness in the left knee and LE. Objective: See treatment diary below      Assessment: Tolerated treatment well. PT notes continuation of decrease ROM and strength t/o the left knee and LE with demonstration of impaired gait pattern and balance s/p left patella fracture with need for continuation of skilled therapy. Plan: Continue per plan of care.       Precautions: Knee ROM 0-90 degrees, Arthritis       Manuals     L Knee  5 min  5 min  5 min     LLE  5 min  5 min  5 min                     Neuro Re-Ed         BOSU Lunges  F/L Lunge   2x10 Each Bilat LE  F/L Lunge   2x10 Each   Bilat LE  F/L Luge   2x10 Each   Bilat LE     SLS    BOSU Squat-Round Down   2x10     Tandem Stance   BOSU March   2 min  2 min     Sidestepping    S/L BOSU Bridge  10x Bilat     Monster Walks                        Ther Ex        NuStep vs Bike  [address]   5 min   Half Circles   [address]   6 min Half Circles  SRC   8 min   Half Circles     HR/TR HEP - HR  On Step   2x10 Bilat   6" step  HEP     SLR x 3  2x10 Each left Only  HEP      TKE w/TBand   10x5" Hold Blue  NT    LAQ HEP   Leg Press   DL 95#  SL 55#   2x10 Each     QSets HEP       SAQ        Bridges   With ABD   2x10 Blue  With ABD   2x10 Blue     SLR HEP       Heel Slides to 90*        S/L Hip Abd HEP       Clamshells        Prone Hip Ext  Prone TKE   10x5" Hold  Prone TKE 10x5" Hold  HEP             Ther Activity        Stepups F/L   2x10 Bilat LE   6" step  HEP     Stepdowns        STS HEP       Gait Training Modalities        CP prn  Declined  Declined  Declined

## 2023-08-28 ENCOUNTER — OFFICE VISIT (OUTPATIENT)
Dept: PHYSICAL THERAPY | Facility: CLINIC | Age: 33
End: 2023-08-28
Payer: COMMERCIAL

## 2023-08-28 DIAGNOSIS — R26.9 GAIT ABNORMALITY: ICD-10-CM

## 2023-08-28 DIAGNOSIS — S82.035D CLOSED NONDISPLACED TRANSVERSE FRACTURE OF LEFT PATELLA WITH ROUTINE HEALING, SUBSEQUENT ENCOUNTER: Primary | ICD-10-CM

## 2023-08-28 PROCEDURE — 97112 NEUROMUSCULAR REEDUCATION: CPT | Performed by: PHYSICAL THERAPIST

## 2023-08-28 PROCEDURE — 97110 THERAPEUTIC EXERCISES: CPT | Performed by: PHYSICAL THERAPIST

## 2023-08-28 PROCEDURE — 97140 MANUAL THERAPY 1/> REGIONS: CPT | Performed by: PHYSICAL THERAPIST

## 2023-08-29 NOTE — PROGRESS NOTES
Daily Note     Today's date: 2023  Patient name: Bucky Rothman  : 1990  MRN: [id number]  Referring provider: Claudette Serna DO  Dx:   Encounter Diagnosis     ICD-10-CM    1. Closed nondisplaced transverse fracture of left patella with routine healing, subsequent encounter  S82.035D       2. Gait abnormality  R26.9                      Subjective:  Patient reports she was sore after the last session to 3/10 level in the left knee. Patient reports she has been doing more standing and walking activities the past few days and is nervous about going to [employer] today with the soreness. Patient reported feeling better post session with 1/10 soreness. Objective: See treatment diary below      Assessment: Tolerated treatment well. PT notes continuation of decrease ROM and strength t/o the left knee and LE with demonstration of impaired gait pattern and balance s/p left patella fracture with need for continuation of skilled therapy. PT notes modified treatment secondary to subjective comments but PT will continue to progress toward therapy goals and full POC as tolerated by patient. Plan: Continue per plan of care.       Precautions: Knee ROM 0-90 degrees, Arthritis       Manuals    L Knee  5 min  5 min  5 min  5 min    LLE  5 min  5 min  5 min  5 min                    Neuro Re-Ed         BOSU Lunges  F/L Lunge   2x10 Each Bilat LE  F/L Lunge   2x10 Each   Bilat LE  F/L Luge   2x10 Each   Bilat LE  2x10 Each Bilat LE    SLS    BOSU Squat-Round Down   2x10  2x10    Tandem Stance   BOSU March   2 min  2 min  3 min    Sidestepping    S/L BOSU Bridge  10x Bilat  S/L Bridge   2x10 Bilat    Monster Walks                        Ther Ex        NuStep vs Bike  [address]   5 min   Half Circles   [address]   6 min Half Circles  [address]   8 min   Half Circles  SRC   8 min   Half Circles    HR/TR HEP - HR  On Step   2x10 Bilat   6" step  HEP     SLR x 3  2x10 Each left Only  HEP TKE w/TBand   10x5" Hold Blue  NT NT   LAQ HEP   Leg Press   DL 95#  SL 55#   2x10 Each  NT   QSets HEP       SAQ        Bridges   With ABD   2x10 Blue  With ABD   2x10 Blue  With ABD  2x10 Blue    SLR HEP       Heel Slides to 90*        S/L Hip Abd HEP       Clamshells        Prone Hip Ext  Prone TKE   10x5" Hold  Prone TKE 10x5" Hold  HEP             Ther Activity        Stepups F/L   2x10 Bilat LE   6" step  HEP     Stepdowns        STS HEP       Gait Training                        Modalities        CP prn  Declined  Declined  Declined  10 min Left Knee

## 2023-08-30 ENCOUNTER — OFFICE VISIT (OUTPATIENT)
Dept: PHYSICAL THERAPY | Facility: CLINIC | Age: 33
End: 2023-08-30
Payer: COMMERCIAL

## 2023-08-30 DIAGNOSIS — R26.9 GAIT ABNORMALITY: ICD-10-CM

## 2023-08-30 DIAGNOSIS — S82.035D CLOSED NONDISPLACED TRANSVERSE FRACTURE OF LEFT PATELLA WITH ROUTINE HEALING, SUBSEQUENT ENCOUNTER: Primary | ICD-10-CM

## 2023-08-30 PROCEDURE — 97112 NEUROMUSCULAR REEDUCATION: CPT | Performed by: PHYSICAL THERAPIST

## 2023-08-30 PROCEDURE — 97140 MANUAL THERAPY 1/> REGIONS: CPT | Performed by: PHYSICAL THERAPIST

## 2023-08-30 PROCEDURE — 97110 THERAPEUTIC EXERCISES: CPT | Performed by: PHYSICAL THERAPIST

## 2023-08-31 ENCOUNTER — OFFICE VISIT (OUTPATIENT)
Dept: OBGYN CLINIC | Facility: CLINIC | Age: 33
End: 2023-08-31

## 2023-08-31 VITALS
HEART RATE: 76 BPM | BODY MASS INDEX: 31.65 KG/M2 | HEIGHT: 65 IN | SYSTOLIC BLOOD PRESSURE: 120 MMHG | DIASTOLIC BLOOD PRESSURE: 85 MMHG | TEMPERATURE: 98 F | WEIGHT: 190 LBS

## 2023-08-31 DIAGNOSIS — S82.035D CLOSED NONDISPLACED TRANSVERSE FRACTURE OF LEFT PATELLA WITH ROUTINE HEALING, SUBSEQUENT ENCOUNTER: Primary | ICD-10-CM

## 2023-08-31 PROCEDURE — 99024 POSTOP FOLLOW-UP VISIT: CPT | Performed by: ORTHOPAEDIC SURGERY

## 2023-08-31 RX ORDER — MONTELUKAST SODIUM 10 MG/1
10 TABLET ORAL
COMMUNITY
Start: 2023-08-24

## 2023-08-31 RX ORDER — CETIRIZINE HYDROCHLORIDE 10 MG/1
10 TABLET ORAL DAILY
COMMUNITY

## 2023-08-31 NOTE — LETTER
August 31, 2023     Patient: Krysta Harden   YOB: 1990   Date of Visit: 8/31/2023       To Whom It May Concern: It is my medical opinion that Krysta Harden may return to light duty immediately with the following restrictions: May be out of her brace but has a 50 pound lifting restriction for the next 4 weeks. She will be seen in 4 weeks and likely return to work full duty without restriction . If you have any questions or concerns, please don't hesitate to call.          Sincerely,        Laura Vaughn    CC: No Recipients

## 2023-08-31 NOTE — PROGRESS NOTES
ASSESSMENT/PLAN:    Diagnoses and all orders for this visit:    Closed nondisplaced transverse fracture of left patella with routine healing, subsequent encounter    Patient may come out of her brace politely. She will continue physical therapy working on strengthening gait training range of motion and work hardening with aggressive strengthening. Patient will be seen back in 4 weeks for likely final visit. Work note provided. Patient was reviewed with the patient that the atrophy may take months to normalize however her strength will be turned to normal for the atrophy most likely. Return in about 4 weeks (around 2023) for Recheck.  _____________________________________________________  CHIEF COMPLAINT:  Chief Complaint   Patient presents with   • Follow-up     SUBJECTIVE:  José Miguel Grossman is a 35y.o. year old female who presents for follow up of left patellar fracture 9 weeks ago. She has been maintained in PT was on a 10 pound lifting restriction. She reports that with her job she will often have to lift bodies off of the floor she can carry them up stairs which she does not feel she can quite do at this point yet. Denies numbness or tingling. She notes she gets little discomfort over the medial string tendon. There are occasional discomfort over the inferior patella. No other complaints. PAST MEDICAL HISTORY:  Past Medical History:   Diagnosis Date   • Arthritis    • Borderline high cholesterol    • Collagen disorder (HCC)     arthritis   • CTS (carpal tunnel syndrome)    • GERD (gastroesophageal reflux disease)    • IBS (irritable bowel syndrome)    • Urinary tract infection    • Varicella      PAST SURGICAL HISTORY:  Past Surgical History:   Procedure Laterality Date   • ADENOIDECTOMY     • ANAL FISTULOTOMY  2021   • COLONOSCOPY     • HEMORRHOID SURGERY  10/30/2020   • NE  DELIVERY ONLY N/A 10/4/2022    Procedure:  SECTION ();   Surgeon: Martha Roe Toussaint-Foster, DO;  Location: [facility];  Service: Obstetrics   • UPPER GASTROINTESTINAL ENDOSCOPY     • WISDOM TOOTH EXTRACTION       FAMILY HISTORY:  Family History   Problem Relation Age of Onset   • Ulcerative colitis Paternal Grandmother    • Diverticulitis Paternal Grandmother    • Colon polyps Maternal Aunt    • Colon cancer Other    • Supraventricular tachycardia Mother    • Other Mother         trali antibody   • Hyperlipidemia Father    • Arthritis Father    • Hypertension Father    • Hypertension Maternal Grandmother    • Heart attack Maternal Grandfather    • Colonic polyp Maternal Grandfather    • Liver disease Maternal Grandfather    • Heart disease Maternal Grandfather    • Dementia Paternal Grandfather    • No Known Problems Son    • Breast cancer Neg Hx    • Ovarian cancer Neg Hx      SOCIAL HISTORY:  Social History     Tobacco Use   • Smoking status: Former     Packs/day: 1.00     Years: 0.00     Total pack years: 0.00     Types: Cigarettes     Quit date: 3/10/2019     Years since quittin.4   • Smokeless tobacco: Never   Vaping Use   • Vaping Use: Never used   Substance Use Topics   • Alcohol use: Yes     Comment: socially prior to knowledge of pregnancy   • Drug use: Never     MEDICATIONS:    Current Outpatient Medications:   •  cetirizine (ZyrTEC) 10 mg tablet, Take 10 mg by mouth daily, Disp: , Rfl:   •  etonogestrel-ethinyl estradiol (NuvaRing) 0.12-0.015 MG/24HR vaginal ring, Insert vaginally and leave in place for 3 consecutive weeks, then remove for 1 week., Disp: 3 each, Rfl: 3  •  norethindrone (Jill) 0.35 MG tablet, Take 1 tablet (0.35 mg total) by mouth daily, Disp: 90 tablet, Rfl: 3  •  norgestimate-ethinyl estradiol (Mell) 0.25-35 MG-MCG per tablet, Take 1 tablet by mouth daily, Disp: 90 tablet, Rfl: 3  •  montelukast (SINGULAIR) 10 mg tablet, Take 10 mg by mouth daily at bedtime, Disp: , Rfl:   •  Prenatal Vit-Fe Fumarate-FA (PRENATAL VITAMIN PO), Take by mouth (Patient not taking: Reported on 7/5/2023), Disp: , Rfl:     ALLERGIES:  Allergies   Allergen Reactions   • Pollen Extract Swelling     Tree Pollen-Watery eyes, swollen glands     REVIEW OF SYSTEMS:  Pertinent items are noted in HPI. A comprehensive review of systems was negative.  _____________________________________________________  PHYSICAL EXAMINATION:  General: well developed and well nourished, alert, oriented times 3 and appears comfortable  Psychiatric: Normal  HEENT:  Normocephalic, atraumatic  Cardiovascular:  Regular  Pulmonary: No wheezing or stridor  Skin: No masses, erthema, lacerations, fluctation, ulcerations  Neurovascular: Intact  MUSCULOSKELETAL EXAMINATION:    Left knee is without effusion erythema or warmth. Reports a mild bruise-like discomfort over the anterior knee. There is slight tenderness with palpation over the medial hamstring tendon. She has full extension with flexion to approximately 125 degrees. No patellar pain at extreme flexion range. Hamstring strength is a 5/5 quad strength is 4+/5. No pain with medial or lateral mobilization of the patella with the knee in extension and relaxed. There is noted quadriceps atrophy. No ligamentous laxity. Light touch sensation intact.  _____________________________________________________  STUDIES REVIEWED:  No new xrays for review based on her clinical exam today.     PROCEDURES PERFORMED:  None today    Niko Bush PA-C

## 2023-08-31 NOTE — PATIENT INSTRUCTIONS
Patient may come out of her brace politely. She will continue physical therapy working on strengthening gait training range of motion and work hardening with aggressive strengthening. Patient will be seen back in 4 weeks for likely final visit. Work note provided. Patient was reviewed with the patient that the atrophy may take months to normalize however her strength will be turned to normal for the atrophy most likely.

## 2023-09-01 NOTE — PROGRESS NOTES
Daily Note     Today's date: 2023  Patient name: Loraine Larson  : 1990  MRN: [id number]  Referring provider: Crys Stokes DO  Dx:   Encounter Diagnosis     ICD-10-CM    1. Closed nondisplaced transverse fracture of left patella with routine healing, subsequent encounter  S82.035D       2. Gait abnormality  R26.9                      Subjective: ***      Objective: See treatment diary below      Assessment: Tolerated treatment {Tolerated treatment :2327811220}. Patient exhibited good technique with therapeutic exercises and would benefit from continued PT to increase L knee ROM/strength and endurance to improve mobility and gait. Plan: Continue per plan of care.       Precautions: Knee ROM 0-90 degrees, Arthritis       Manuals    L Knee 5 min  5 min  5 min  5 min     LLE 5 min  5 min  5 min  5 min                     Neuro Re-Ed         BOSU Lunges F/L Lunge   2x10 Each Bilat LE  F/L Lunge   2x10 Each   Bilat LE  F/L Luge   2x10 Each   Bilat LE  2x10 Each Bilat LE     SLS   BOSU Squat-Round Down   2x10  2x10     Tandem Stance  BOS   2 min  2 min  3 min  DC   Sidestepping   S/L BOSU Bridge  10x Bilat  S/L Bridge   2x10 Bilat     Monster Walks        Side step and squat      With 6# [employer] walk outs      *   Ther Ex        NuStep vs Bike [address]   5 min   Half Circles   [address]   6 min Half Circles  [address]   8 min   Half Circles  SRC   8 min   Half Circles  Full rotations   HR/TR  On Step   2x10 Bilat   6" step  HEP      SLR x 3 2x10 Each left Only  HEP       TKE w/TBand  10x5" Hold Blue  NT NT    LAQ   Leg Press   DL 95#  SL 55#   2x10 Each  NT    QSets        SAQ        Bridges  With ABD   2x10 Blue  With ABD   2x10 Blue  With ABD  2x10 Blue     SLR        Heel Slides to 90*        S/L Hip Abd        Clamshells        Prone Hip Ext Prone TKE   10x5" Hold  Prone TKE 10x5" Hold  HEP              Ther Activity        Stepups F/L  2x10 Bilat LE   6" step HEP      Stepdowns        STS        Gait Training     9/5                   Modalities     9/5   CP prn Declined  Declined  Declined  10 min Left Knee

## 2023-09-05 ENCOUNTER — APPOINTMENT (OUTPATIENT)
Dept: PHYSICAL THERAPY | Facility: CLINIC | Age: 33
End: 2023-09-05
Payer: COMMERCIAL

## 2023-09-08 NOTE — PROGRESS NOTES
Daily Note     Today's date: 2023  Patient name: Bucky Rothman  : 1990  MRN: [id number]  Referring provider: Claudette Serna DO  Dx:   Encounter Diagnosis     ICD-10-CM    1. Closed nondisplaced transverse fracture of left patella with routine healing, subsequent encounter  S82.035D       2. Gait abnormality  R26.9                      Subjective: "Stairs are probably the thing I have most difficulty with."  Patient reports that she is pleased with her L knee, but does report stiffness at times. Objective: See treatment diary below      Assessment: Tolerated treatment well. Patient exhibited good technique with therapeutic exercises and would benefit from continued PT to increase L knee ROM/strength and endurance to improve mobility and gait. Patient demonstrates good tolerance to the progression of her therapeutic ex program to transition towards work conditioning to improve L LE stability, strength and balance. Plan: Continue per plan of care.       Precautions: Knee ROM 0-90 degrees, Arthritis       Manuals    L Knee 5 min  5 min  5 min  5 min  5'   LLE 5 min  5 min  5 min  5 min  5'                   Neuro Re-Ed         F/L Lunge  F/L Lunge   2x10 Each Bilat LE  F/L Lunge   2x10 Each   Bilat LE  F/L Luge   2x10 Each   Bilat LE  2x10 Each Bilat LE  HEP       BOSU Squat-Round Down   2x10  2x10  BOSU Squat c PWB 2x10     BOSU March   2 min  2 min  3 min  DC   Sidestepping   S/L BOSU Bridge  10x Bilat  S/L Bridge   2x10 Bilat  DC   [place] Lunges     10# 10x ea bilat   Push/pull Cart      40# 3Laps   Lunge Walk      6#WB 30'x2   Side step and squat      6#WB 30'x2   S/L OH 3 way Slam Ball      10x ea bilat 6#WB   Ther Ex        NuStep vs Bike [address]   5 min   Half Circles   [address]   6 min Half Circles  [address]   8 min   Half Circles  [address]   8 min   Half Circles  [address] 10' Full Revs   HR/TR  On Step   2x10 Bilat   6" step  HEP      SLR x 3 2x10 Each left Only  HEP TKE w/TBand  10x5" Hold Blue  NT NT DC   LAQ   Leg Press   DL 95#  SL 55#   2x10 Each  # DL  85# SL   2x10ea   QSets        SAQ        Bridges  With ABD   2x10 Blue  With ABD   2x10 Blue  With ABD  2x10 Blue  DC   SLR        Heel Slides to 90*        S/L Hip Abd        Clamshells        Prone Hip Ext Prone TKE   10x5" Hold  Prone TKE 10x5" Hold  HEP              Ther Activity     9/11   Stepups F/L  2x10 Bilat LE   6" step  HEP      Stepdowns        STS        Gait Training     9/11                   Modalities     9/11   CP prn Declined  Declined  Declined  10 min Left Knee  NT

## 2023-09-08 NOTE — PROGRESS NOTES
PT Re-Evaluation     Today's date: 2023  Patient name: Nazia Mills  : 1990  MRN: [id number]  Referring provider: Ambrocio Henry DO  Dx:   Encounter Diagnosis     ICD-10-CM    1. Closed nondisplaced transverse fracture of left patella with routine healing, subsequent encounter  S82.035D       2. Gait abnormality  R26.9                      Assessment  Assessment details: PT notes the patient with improved ROM but decrease strength and stability t/o the left knee and LE s/p patellar fracture with demonstration of impaired gait pattern and balance with need for continuation of skilled therapy for 4 weeks with focus on gait/balance, strengthening, manual therapy, analgesic modalities, and transition to HEP. Impairments: abnormal gait, abnormal or restricted ROM, activity intolerance, impaired balance, impaired physical strength, lacks appropriate home exercise program, pain with function and weight-bearing intolerance  Other impairment: decreased flexibility  Functional limitations: difficulty with stair negotiationUnderstanding of Dx/Px/POC: good   Prognosis: good    Goals  STGs:  1. Initiate and complete HEP with verbal cues. 2.  Decrease L knee pain by > 25% in 4 weeks. 3.  Improve L knee ROM by 15-20 degrees in 4 weeks. 4.  Improve LLE strength by 1/2-1 grade in 4 weeks. LTGs:  1. Patient to be I with HEP in 8 weeks. 2. Improve L knee ROM to 0-140 degrees in 8 weeks to improve function. 3. Improve LLE strength to 4+ to 5/5 t/o in 8 weeks to improve function. 4. Decrease L knee pain to < or = to 1-2/10 with activity in 8 weeks to improve function. 5.  Patient to ambulate with normalized gait pattern without brace in 8 weeks. 6.  Stair negotiation is improved to PLOF in 8 weeks. 7.  Recreational performance is improved to PLOF in 8 weeks. 8.  ADL performance is improved to PLOF in 8 weeks. 9.  Work performance is improved to maximal level of function in 8 weeks.    10. Decrease edema  L knee by > 1-2 cm in 8 weeks to help improve flexibility. 11.  Patient to report no knee instability in 8 weeks. Plan  Plan details: Transition to [facility]. Patient would benefit from: skilled physical therapy  Planned modality interventions: cryotherapy and thermotherapy: hydrocollator packs  Planned therapy interventions: IASTM, manual therapy, balance, balance/weight bearing training, neuromuscular re-education, patient education, postural training, self care, strengthening, therapeutic activities, stretching, therapeutic exercise, gait training, functional ROM exercises, flexibility and home exercise program  Frequency: 2x week  Duration in weeks: 4  Treatment plan discussed with: patient        Subjective Evaluation    History of Present Illness  Date of onset: 2023  Mechanism of injury: trauma  Mechanism of injury: Presently the patient has attended 6 sessions of skilled therapy and feels 70-80% improvement since the start of therapy. Patient reports the knee is moving better with improved hip flexibility. Patient reports the LE still feeling weak with difficulty with stairs, squatting, lifting, carrying, , ADL and transfers. Patient reports she is not ready for RTW as . Patient feels she requires additional therapy to get the LE stronger so she can fully return to work and ADL. Patient Goals  Patient goals for therapy: increased motion, improved balance, decreased pain, increased strength, decreased edema, return to work and return to sport/leisure activities  Patient goal: "To get my knee stronger, to feel confident when lifting at work."    Pain  Current pain ratin  At best pain ratin  At worst pain ratin  Location: L Knee  Quality: dull ache, discomfort and pulling  Relieving factors: rest  Aggravating factors: stair climbing and lifting    Social Support  Stairs in house: yes   Patient lives at: [facility].   Lives with: young children and spouse    Employment status: working (Working full time -  )    Diagnostic Tests  X-ray: normal (patella is healing )  Treatments  Previous treatment: immobilization and medication  Current treatment: medication and physical therapy        Objective     Tenderness   Left Knee   No tenderness in the inferior patella, lateral patella, LCL (distal), LCL (proximal), MCL (distal), MCL (proximal), medial patella, patellar tendon and quadriceps tendon. Neurological Testing     Sensation     Knee   Left Knee   Intact: light touch    Right Knee   Intact: light touch     Reflexes   Left   Patellar (L4): normal (2+)  Achilles (S1): normal (2+)    Right   Patellar (L4): normal (2+)  Achilles (S1): normal (2+)    Active Range of Motion   Left Hip   Normal active range of motion    Right Hip   Normal active range of motion  Left Knee   Normal active range of motion  Flexion: Left knee active flexion: Able to bend to 105 with pulling. Right Knee   Normal active range of motion  Left Ankle/Foot   Normal active range of motion    Mobility   Patellar Mobility:   Left Knee   WFL: medial, lateral, superior and inferior. Patellar Static Positioning   Left Knee: [facility]    Strength/Myotome Testing     Left Hip   Planes of Motion   Flexion: 4  Extension: 4+  Abduction: 4+  Adduction: 5  External rotation: 4  Internal rotation: 4+    Right Hip   Normal muscle strength    Left Knee   Flexion: 4+  Extension: 4  Quadriceps contraction: fair    Right Knee   Flexion: WFL  Extension: WFL  Quadriceps contraction: good    Left Ankle/Foot   Normal strength    Tests     Left Hip   Negative Reina Wolfe: Negative. 90/90 SLR: Negative. SLR: Negative.      Swelling     Left Knee Girth Measurement (cm)   Joint line: 39 cm    Right Knee Girth Measurement (cm)   Joint line: 38.5 cm    Ambulation   Weight-Bearing Status   Weight-Bearing Status (Left): weight-bearing as tolerated   Assistive device used: none    Ambulation: Level Surfaces   Ambulation without assistive device: independent    Ambulation: Stairs   Ascend stairs: independent  Pattern: non-reciprocal  Descend stairs: independent  Pattern: non-reciprocal    Observational Gait   Gait: within functional limits   Walking speed and left stance time within functional limits.                  Precautions: Knee ROM 0-90 degrees, Arthritis   POC 10/11/23

## 2023-09-11 ENCOUNTER — EVALUATION (OUTPATIENT)
Dept: PHYSICAL THERAPY | Facility: CLINIC | Age: 33
End: 2023-09-11
Payer: COMMERCIAL

## 2023-09-11 DIAGNOSIS — R26.9 GAIT ABNORMALITY: ICD-10-CM

## 2023-09-11 DIAGNOSIS — S82.035D CLOSED NONDISPLACED TRANSVERSE FRACTURE OF LEFT PATELLA WITH ROUTINE HEALING, SUBSEQUENT ENCOUNTER: Primary | ICD-10-CM

## 2023-09-11 PROCEDURE — 97140 MANUAL THERAPY 1/> REGIONS: CPT

## 2023-09-11 PROCEDURE — 97110 THERAPEUTIC EXERCISES: CPT

## 2023-09-11 PROCEDURE — 97112 NEUROMUSCULAR REEDUCATION: CPT

## 2023-09-11 PROCEDURE — 97140 MANUAL THERAPY 1/> REGIONS: CPT | Performed by: PHYSICAL THERAPIST

## 2023-09-12 NOTE — PROGRESS NOTES
Daily Note     Today's date: 2023  Patient name: Lynsey Willson  : 1990  MRN: [id number]  Referring provider: Radha Piña DO  Dx:   Encounter Diagnosis     ICD-10-CM    1. Closed nondisplaced transverse fracture of left patella with routine healing, subsequent encounter  S82.035D       2. Gait abnormality  R26.9                      Subjective: ***      Objective: See treatment diary below      Assessment: Tolerated treatment {Tolerated treatment :0030591613}. Patient exhibited good technique with therapeutic exercises and would benefit from continued PT to increase L knee ROM/strength and endurance to improve mobility and gait. Plan: Continue per plan of care.       Precautions: Knee ROM 0-90 degrees, Arthritis       Manuals    L Knee 5 min  5 min  5 min  5'    LLE 5 min  5 min  5 min  5'                    Neuro Re-Ed        F/L Lunge  F/L Lunge   2x10 Each   Bilat LE  F/L Luge   2x10 Each   Bilat LE  2x10 Each Bilat LE  HEP       BOSU Squat-Round Down   2x10  2x10  BOSU Squat c PWB 2x10     BOSU March   2 min  2 min  3 min  DC    Sidestepping  S/L BOSU Bridge  10x Bilat  S/L Bridge   2x10 Bilat  DC    [place] Lunges    10# 10x ea bilat    Push/pull Cart     40# 3Laps    Lunge Walk     6#WB 30'x2    Side step and squat     6#WB 30'x2    S/L [place] 3 way Slam Ball     10x ea bilat 6#WB    Ther Ex       NuStep vs Bike [address]   6 min Half Circles  [address]   8 min   Half Circles  [address]   8 min   Half Circles  [address] 10' Full Revs    HR/TR On Step   2x10 Bilat   6" step  HEP       SLR x 3 HEP        TKE w/TBand 10x5" Hold Blue  NT NT DC    LAQ  Leg Press   DL 95#  SL 55#   2x10 Each  # DL  85# SL   2x10ea    QSets        SAQ        Bridges With ABD   2x10 Blue  With ABD   2x10 Blue  With ABD  2x10 Blue  DC    SLR        Heel Slides to 90*        S/L Hip Abd        Clamshells        Prone Hip Ext Prone TKE 10x5" Hold  HEP               Ther Activity     9/13   Stepups F/L 2x10 Bilat LE   6" step  HEP       Stepdowns        STS        Gait Training    9/11 9/13                   Modalities    9/11 9/13   CP prn Declined  Declined  10 min Left Knee  NT

## 2023-09-13 ENCOUNTER — APPOINTMENT (OUTPATIENT)
Dept: PHYSICAL THERAPY | Facility: CLINIC | Age: 33
End: 2023-09-13
Payer: COMMERCIAL

## 2023-09-13 NOTE — PROGRESS NOTES
Daily Note     Today's date: 2023  Patient name: Kela Duane  : 1990  MRN: [id number]  Referring provider: Marco Dorsey DO  Dx:   Encounter Diagnosis     ICD-10-CM    1. Closed nondisplaced transverse fracture of left patella with routine healing, subsequent encounter  S82.035D       2. Gait abnormality  R26.9                      Subjective:  Patient reports her left thigh was tired and sore after the last session but overall knows she has to get the leg stronger. Objective: See treatment diary below      Assessment: Tolerated treatment well. PT notes continuation of decrease strength and stability t/o the left knee and LE with demonstration of impaired gait pattern and balance s/p left patellar fracture with need for continuation of skilled therapy. Plan: Continue per plan of care.       Precautions: Knee ROM 0-90 degrees, Arthritis       Manuals    L Knee 5 min  5 min  5 min  5' 5 min    LLE 5 min  5 min  5 min  5' 5 min                    Neuro Re-Ed        F/L Lunge  F/L Lunge   2x10 Each   Bilat LE  F/L Luge   2x10 Each   Bilat LE  2x10 Each Bilat LE  HEP       BOSU Squat-Round Down   2x10  2x10  BOSU Squat c PWB 2x10 Sit to stand with BOSU Round Down   10x   No UE     BOSU March   2 min  2 min  3 min  DC    Sidestepping  S/L BOSU Bridge  10x Bilat  S/L Bridge   2x10 Bilat  DC    Mayank Lunges    10# 10x ea bilat 15#   10x Each Bilat LE    Push/pull Cart     40# 3Laps 40#   4x30 Feet    Lunge Walk     6#WB 30'x2 8# Ball  2x30 Feet   Side step and squat     6#WB 30'x2 8# Ball   2x30 Feet    S/L [place] 3 way Slam Ball     10x ea bilat 6#WB 10x Each   6# Ball    Ther Ex       NuStep vs Bike [address]   6 min Half Circles  [address]   8 min   Half Circles  [address]   8 min   Half Circles  [address] 10' Full Revs [address]   10 min L3    HR/TR On Step   2x10 Bilat   6" step  HEP       SLR x 3 HEP        TKE w/TBand 10x5" Hold Blue  NT NT DC    LAQ  Leg Press   DL 95#  SL 55#   2x10 Each  # DL  85# SL   2x10ea 135# DL   85# SL   2x10 Each    QSets        SAQ        Bridges With ABD   2x10 Blue  With ABD   2x10 Blue  With ABD  2x10 Blue  DC    SLR        Heel Slides to 90*        S/L Hip Abd        Clamshells        Prone Hip Ext Prone TKE 10x5" Hold  HEP               Ther Activity    9/11 9/14   Stepups F/L 2x10 Bilat LE   6" step  HEP       Stepdowns        STS        Gait Training    9/11 9/14                   Modalities    9/11 9/14   CP prn Declined  Declined  10 min Left Knee  NT Declined

## 2023-09-14 ENCOUNTER — OFFICE VISIT (OUTPATIENT)
Dept: PHYSICAL THERAPY | Facility: CLINIC | Age: 33
End: 2023-09-14
Payer: COMMERCIAL

## 2023-09-14 DIAGNOSIS — R26.9 GAIT ABNORMALITY: ICD-10-CM

## 2023-09-14 DIAGNOSIS — S82.035D CLOSED NONDISPLACED TRANSVERSE FRACTURE OF LEFT PATELLA WITH ROUTINE HEALING, SUBSEQUENT ENCOUNTER: Primary | ICD-10-CM

## 2023-09-14 PROCEDURE — 97110 THERAPEUTIC EXERCISES: CPT | Performed by: PHYSICAL THERAPIST

## 2023-09-14 PROCEDURE — 97112 NEUROMUSCULAR REEDUCATION: CPT | Performed by: PHYSICAL THERAPIST

## 2023-09-14 PROCEDURE — 97140 MANUAL THERAPY 1/> REGIONS: CPT | Performed by: PHYSICAL THERAPIST

## 2023-09-15 NOTE — PROGRESS NOTES
Daily Note     Today's date: 2023  Patient name: Aquiles Escamilla  : 1990  MRN: [id number]  Referring provider: Akila Espino DO  Dx:   Encounter Diagnosis     ICD-10-CM    1. Closed nondisplaced transverse fracture of left patella with routine healing, subsequent encounter  S82.035D       2. Gait abnormality  R26.9                      Subjective: ***      Objective: See treatment diary below      Assessment: Tolerated treatment {Tolerated treatment :8338257211}. PT notes continuation of decrease strength and stability t/o the left knee and LE with demonstration of impaired dynamic balance with need for continuation of skilled therapy. Plan: Continue per plan of care.       Precautions: Knee ROM 0-90 degrees, Arthritis       Manuals    L Knee 5 min  5 min  5' 5 min     LLE 5 min  5 min  5' 5 min                     Neuro Re-Ed        F/L Lunge  F/L Luge   2x10 Each   Bilat LE  2x10 Each Bilat LE  HEP       BOSU Squat-Round Down   2x10  2x10  BOSU Squat c PWB 2x10 Sit to stand with BOSU Round Down   10x   No UE      2 min  3 min  DC     Sidestepping S/L BOSU Bridge  10x Bilat  S/L Bridge   2x10 Bilat  DC     [place] Lunges   10# 10x ea bilat 15#   10x Each Bilat LE     Push/pull Cart    40# 3Laps 40#   4x30 Feet     Lunge Walk    6#WB 30'x2 8# Ball  2x30 Feet    Side step and squat    6#WB 30'x2 8# Ball   2x30 Feet     S/L [place] 3 way Slam Ball    10x ea bilat 6#WB 10x Each   6# Ball     Ther Ex       NuStep vs Bike [address]   8 min   Half Circles  [address]   8 min   Half Circles  [address] 10' Full Revs [address]   10 min L3     HR/TR HEP        SLR x 3        TKE w/TBand NT NT DC     LAQ Leg Press   DL 95#  SL 55#   2x10 Each  # DL  85# SL   2x10ea 135# DL   85# SL   2x10 Each     QSets        SAQ        Bridges With ABD   2x10 Blue  With ABD  2x10 Blue  DC     SLR        Heel Slides to 90*        S/L Hip Abd        Clamshells        Prone Hip Ext HEP Ther Activity   9/11 9/14    Stepups F/L HEP        Stepdowns        STS        Gait Training   9/11 9/14                    Modalities   9/11 9/14    CP prn Declined  10 min Left Knee  NT Declined

## 2023-09-18 ENCOUNTER — APPOINTMENT (OUTPATIENT)
Dept: PHYSICAL THERAPY | Facility: CLINIC | Age: 33
End: 2023-09-18
Payer: COMMERCIAL

## 2023-09-18 DIAGNOSIS — S82.035D CLOSED NONDISPLACED TRANSVERSE FRACTURE OF LEFT PATELLA WITH ROUTINE HEALING, SUBSEQUENT ENCOUNTER: Primary | ICD-10-CM

## 2023-09-18 DIAGNOSIS — R26.9 GAIT ABNORMALITY: ICD-10-CM

## 2023-09-20 ENCOUNTER — OFFICE VISIT (OUTPATIENT)
Dept: PHYSICAL THERAPY | Facility: CLINIC | Age: 33
End: 2023-09-20
Payer: COMMERCIAL

## 2023-09-20 DIAGNOSIS — R26.9 GAIT ABNORMALITY: ICD-10-CM

## 2023-09-20 DIAGNOSIS — S82.035D CLOSED NONDISPLACED TRANSVERSE FRACTURE OF LEFT PATELLA WITH ROUTINE HEALING, SUBSEQUENT ENCOUNTER: Primary | ICD-10-CM

## 2023-09-20 PROCEDURE — 97110 THERAPEUTIC EXERCISES: CPT | Performed by: PHYSICAL THERAPIST

## 2023-09-20 PROCEDURE — 97112 NEUROMUSCULAR REEDUCATION: CPT | Performed by: PHYSICAL THERAPIST

## 2023-09-20 PROCEDURE — 97140 MANUAL THERAPY 1/> REGIONS: CPT | Performed by: PHYSICAL THERAPIST

## 2023-09-20 NOTE — PROGRESS NOTES
Daily Note     Today's date: 2023  Patient name: Carmen Schulte  : 1990  MRN: [id number]  Referring provider: Bailey Kennedy DO  Dx:   Encounter Diagnosis     ICD-10-CM    1. Closed nondisplaced transverse fracture of left patella with routine healing, subsequent encounter  S82.035D       2. Gait abnormality  R26.9                      Subjective:  Patient reports the thighs have calmed down with soreness and tightness but states the left knee has been a little sore to 3/10 level. Objective: See treatment diary below      Assessment: Tolerated treatment well. PT notes continuation of decrease stability and strength t/o the left knee and LE s/p left patella fracture with need for continuation of skilled therapy. Plan: Continue per plan of care.       Precautions: Knee ROM 0-90 degrees, Arthritis       Manuals    L Knee 5 min  5 min  5' 5 min  10 min Bilat HS, piriformis and quad    LLE 5 min  5 min  5' 5 min  Left knee PA mobs and bilateral hip traction                    Neuro Re-Ed       F/L Lunge  F/L Luge   2x10 Each   Bilat LE  2x10 Each Bilat LE  HEP       BOSU Squat-Round Down   2x10  2x10  BOSU Squat c PWB 2x10 Sit to stand with BOSU Round Down   10x   No UE  2x10   6# Ball    2 min  3 min  DC     Sidestepping S/L BOSU Bridge  10x Bilat  S/L Bridge   2x10 Bilat  DC     [place] Lunges   10# 10x ea bilat 15#   10x Each Bilat LE  20#   10x Each Bilat LE    Push/pull Cart    40# 3Laps 40#   4x30 Feet  50#   5x30 Feet    Lunge Walk    6#WB 30'x2 8# Ball  2x30 Feet Diagonal 4x30 Feet  8# Ball    Side step and squat    6#WB 30'x2 8# Ball   2x30 Feet  DC   S/L OH 3 way Slam Ball    10x ea bilat 6#WB 10x Each   6# Ball  10x Each   8# Ball    Ther Ex      NuStep vs Bike [facility]   8 min   Half Circles  [facility]   8 min   Half Circles  [facility] 10' Full Revs [facility]   10 min L3  Elliptical  10 Min L1    HR/TR HEP        SLR x 3        TKE w/TBand NT NT DC     LAQ Leg Press   DL 95#  SL 55#   2x10 Each  # DL  85# SL   2x10ea 135# DL   85# SL   2x10 Each  145# DL   85# SL  2x10 Each    QSets        SAQ        Bridges With ABD   2x10 Blue  With ABD  2x10 Blue  DC     SLR        Heel Slides to 90*        S/L Hip Abd        Clamshells        Prone Hip Ext HEP                Ther Activity   9/11 9/14 9/20   Stepups F/L HEP        Stepdowns        STS        Gait Training   9/11 9/14 9/20                   Modalities   9/11 9/14 9/20   CP prn Declined  10 min Left Knee  NT Declined  Declined

## 2023-09-29 ENCOUNTER — OFFICE VISIT (OUTPATIENT)
Dept: OBGYN CLINIC | Facility: CLINIC | Age: 33
End: 2023-09-29

## 2023-09-29 VITALS
SYSTOLIC BLOOD PRESSURE: 120 MMHG | HEIGHT: 65 IN | HEART RATE: 78 BPM | TEMPERATURE: 98.1 F | DIASTOLIC BLOOD PRESSURE: 80 MMHG | BODY MASS INDEX: 31.65 KG/M2 | WEIGHT: 190 LBS

## 2023-09-29 DIAGNOSIS — S82.035D CLOSED NONDISPLACED TRANSVERSE FRACTURE OF LEFT PATELLA WITH ROUTINE HEALING, SUBSEQUENT ENCOUNTER: Primary | ICD-10-CM

## 2023-09-29 PROCEDURE — 99024 POSTOP FOLLOW-UP VISIT: CPT | Performed by: ORTHOPAEDIC SURGERY

## 2023-09-29 NOTE — PROGRESS NOTES
Patient Name:  Viv Peck  MRN:  [id number]    Assessment     1. Closed nondisplaced transverse fracture of left patella with routine healing, subsequent encounter            Plan     1. I would recommend follow-up as needed. She has transitioned out of physical therapy but should continue the home exercise program.  I have encouraged her to contact me if any questions or concerns arise. Return if symptoms worsen or fail to improve. Subjective   Viv Peck returns for follow-up of her left patella fracture. The patient is 3 month(s) post injury and returns for routine follow-up. Patient complains of difficulty with stairs. She denies pain but feels that the leg is tired and climbing stairs. She stopped therapy about a week and a half ago and is not sure if she needs to continue therapy. She does continue the home exercise program.      Objective     /80 (BP Location: Left arm)   Pulse 78   Temp 98.1 °F (36.7 °C) (Temporal)   Ht 5' 5" (1.651 m)   Wt 86.2 kg (190 lb)   BMI 31.62 kg/m²     The exam today demonstrated full range of motion of the knee without complaint. She does have a slight degree of tenderness at the inferior pole of the patella, proximal patellar tendon. There is no effusion noted. She has 5/5 strength of extension and flexion of the knee without complaints. She has no complaints with patellar glide or compression.         Carmella Lopez

## 2023-10-10 NOTE — PROGRESS NOTES
PT Discharge    Today's date: 10/11/2023  Patient name: Saturnino Miles  : 1990  MRN: [id number]  Referring provider: Lan Mir DO  Dx:   Encounter Diagnosis     ICD-10-CM    1. Closed nondisplaced transverse fracture of left patella with routine healing, subsequent encounter  S82.035D       2. Gait abnormality  R26.9                      Assessment  Assessment details: PT notes the patient with improved ROM and strength/stability t/o the left knee and LE s/p patellar fracture with demonstration of improved gait pattern and balance so PT notes the patient has met all therapy goals and is ready for a transition to HEP. Impairments: abnormal gait, abnormal or restricted ROM, activity intolerance, impaired balance, impaired physical strength, lacks appropriate home exercise program, pain with function and weight-bearing intolerance  Other impairment: decreased flexibility  Functional limitations: difficulty with stair negotiationUnderstanding of Dx/Px/POC: good   Prognosis: good    Goals  STGs:  1. Initiate and complete HEP with verbal cues. 2.  Decrease L knee pain by > 25% in 4 weeks. 3.  Improve L knee ROM by 15-20 degrees in 4 weeks. 4.  Improve LLE strength by 1/2-1 grade in 4 weeks. LTGs:  1. Patient to be I with HEP in 8 weeks. 2. Improve L knee ROM to 0-140 degrees in 8 weeks to improve function. 3. Improve LLE strength to 4+ to 5/5 t/o in 8 weeks to improve function. 4. Decrease L knee pain to < or = to 1-2/10 with activity in 8 weeks to improve function. 5.  Patient to ambulate with normalized gait pattern without brace in 8 weeks. 6.  Stair negotiation is improved to PLOF in 8 weeks. 7.  Recreational performance is improved to PLOF in 8 weeks. 8.  ADL performance is improved to PLOF in 8 weeks. 9.  Work performance is improved to maximal level of function in 8 weeks. 10.  Decrease edema  L knee by > 1-2 cm in 8 weeks to help improve flexibility.   11.  Patient to report no knee instability in 8 weeks. Plan  Plan details: Transition to HEP. Patient would benefit from: skilled physical therapy  Planned modality interventions: cryotherapy and thermotherapy: hydrocollator packs  Planned therapy interventions: IASTM, manual therapy, balance, balance/weight bearing training, neuromuscular re-education, patient education, postural training, self care, strengthening, therapeutic activities, stretching, therapeutic exercise, gait training, functional ROM exercises, flexibility and home exercise program  Frequency: 2x week  Duration in weeks: 1  Treatment plan discussed with: patient        Subjective Evaluation    History of Present Illness  Date of onset: 2023  Mechanism of injury: trauma  Mechanism of injury: Presently the patient has attended 9 sessions of skilled therapy and feels 80-90% improvement since the start of therapy. Patient reports the left knee is moving better with improved flexibility of the left hip and LE. Patient reports her walking is normal with improved balance with slight limitations with squatting and stairs. Patient reports she is going to sign up for the gym and look to get healthier and stronger. Patient states she is happy with current status and ready for a transition to [facility]. Patient Goals  Patient goals for therapy: increased motion, improved balance, decreased pain, increased strength, decreased edema, return to work and return to sport/leisure activities  Patient goal: "To get my knee stronger, to feel confident when lifting at work."    Pain  Current pain ratin  At best pain ratin  At worst pain ratin  Location: L Knee  Quality: dull ache, discomfort and pulling  Relieving factors: rest  Aggravating factors: stair climbing and lifting  Progression: improved    Social Support  Stairs in house: yes   Patient lives at: [facility].   Lives with: young children and spouse    Employment status: working (Working full time -  )    Diagnostic Tests  X-ray: normal (patella is healing )  Treatments  Previous treatment: immobilization and medication  Current treatment: medication and physical therapy        Objective     Tenderness   Left Knee   No tenderness in the inferior patella, lateral patella, LCL (distal), LCL (proximal), MCL (distal), MCL (proximal), medial patella, patellar tendon and quadriceps tendon. Neurological Testing     Sensation     Knee   Left Knee   Intact: Light touch    Right Knee   Intact: light touch     Reflexes   Left   Patellar (L4): normal (2+)  Achilles (S1): normal (2+)    Right   Patellar (L4): normal (2+)  Achilles (S1): normal (2+)    Active Range of Motion   Left Hip   Normal active range of motion    Right Hip   Normal active range of motion  Left Knee   Normal active range of motion  Flexion: Left knee active flexion: Able to bend to 105 with pulling. Right Knee   Normal active range of motion  Left Ankle/Foot   Normal active range of motion    Mobility   Patellar Mobility:   Left Knee   WFL: medial, lateral, superior and inferior. Patellar Static Positioning   Left Knee: [facility]    Strength/Myotome Testing     Left Hip   Planes of Motion   Flexion: 4+  Extension: 5  Abduction: 5  Adduction: 5  External rotation: 4+  Internal rotation: 5    Right Hip   Normal muscle strength    Left Knee   Flexion: 5  Extension: 5  Quadriceps contraction: good    Right Knee   Flexion: WFL  Extension: WFL  Quadriceps contraction: good    Left Ankle/Foot   Normal strength    Tests     Left Hip   Negative Reina Wolfe: Negative. 90/90 SLR: Negative. SLR: Negative.      Swelling     Left Knee Girth Measurement (cm)   Joint line: 39 cm    Right Knee Girth Measurement (cm)   Joint line: 39 cm    Ambulation   Weight-Bearing Status   Weight-Bearing Status (Left): weight-bearing as tolerated   Assistive device used: none    Ambulation: Level Surfaces   Ambulation without assistive device: independent    Ambulation: Stairs   Ascend stairs: independent  Pattern: non-reciprocal  Descend stairs: independent  Pattern: non-reciprocal    Observational Gait   Gait: within functional limits   Walking speed, stride length and left stance time within functional limits.                  Precautions: Knee ROM 0-90 degrees, Arthritis   POC 10/11/23

## 2023-10-11 ENCOUNTER — EVALUATION (OUTPATIENT)
Dept: PHYSICAL THERAPY | Facility: CLINIC | Age: 33
End: 2023-10-11
Payer: COMMERCIAL

## 2023-10-11 DIAGNOSIS — R26.9 GAIT ABNORMALITY: ICD-10-CM

## 2023-10-11 DIAGNOSIS — S82.035D CLOSED NONDISPLACED TRANSVERSE FRACTURE OF LEFT PATELLA WITH ROUTINE HEALING, SUBSEQUENT ENCOUNTER: Primary | ICD-10-CM

## 2023-10-11 PROCEDURE — 97110 THERAPEUTIC EXERCISES: CPT

## 2023-10-11 PROCEDURE — 97535 SELF CARE MNGMENT TRAINING: CPT

## 2023-10-11 PROCEDURE — 97140 MANUAL THERAPY 1/> REGIONS: CPT | Performed by: PHYSICAL THERAPIST

## 2023-10-11 NOTE — PROGRESS NOTES
Daily Note     Today's date: 10/11/2023  Patient name: Cullen Lizama  : 1990  MRN: [id number]  Referring provider: Eduardo Acevedo DO  Dx:   Encounter Diagnosis     ICD-10-CM    1. Closed nondisplaced transverse fracture of left patella with routine healing, subsequent encounter  S82.035D       2. Gait abnormality  R26.9                      Subjective: Patient reports that she has been doing well. 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 Patient reports returning to her regular home activities. Patient tends to feel mild weakness in my leg after climbing stairs multiple times or lifting repeatedly. Objective: See treatment diary below      Assessment: Tolerated treatment well. Patient demonstrated a good understanding of her HEP. Patient would benefit from a transition from skilled rehab to HEP at this time. Patient will be able to maintain the gains she's made here in OP PT. Plan: Patient discharged to [facility] at this time.      Precautions: Knee ROM 0-90 degrees, Arthritis       Manuals 8/30 9/11 9/14 9/20 10/11   L Knee 5 min  5' 5 min  10 min Bilat HS, piriformis and quad  5'   LLE 5 min  5' 5 min  Left knee PA mobs and bilateral hip traction                     Neuro Re-Ed   9/11 9/14 9/20 10/11   F/L Lunge  2x10 Each Bilat LE  HEP        2x10  BOSU Squat c PWB 2x10 Sit to stand with BOSU Round Down   10x   No UE  2x10   6# Ball     3 min  DC      Sidestepping S/L Bridge   2x10 Bilat  DC      Mayank Lunges  10# 10x ea bilat 15#   10x Each Bilat LE  20#   10x Each Bilat LE     Push/pull Cart   40# 3Laps 40#   4x30 Feet  50#   5x30 Feet     Lunge Walk   6#WB 30'x2 8# Ball  2x30 Feet Diagonal 4x30 Feet  8# Ball  Diagonal 2x30' 8#WB   Side step and squat   6#WB 30'x2 8# Ball   2x30 Feet  DC    S/L OH 3 way Slam Ball   10x ea bilat 6#WB 10x Each   6# Ball  10x Each   8# Ball     Ther Ex 9/11 9/14 9/20 10/11   NuStep vs Bike [address]   8 min   Half Circles  [address] 10' Full Revs [address]   10 min L3  Elliptical  10 Min L1  Elliptical 10'L2   HR/TR        SLR x 3        TKE w/TBand NT DC      LAQ # DL  85# SL   2x10ea 135# DL   85# SL   2x10 Each  145# DL   85# SL  2x10 Each     QSets        SAQ        Bridges With ABD  2x10 Blue  DC      SLR        Heel Slides to 90*        S/L Hip Abd        Clamshells        Prone Hip Ext             HEP 15'   Ther Activity  9/11 9/14 9/20 10/11   Stepups F/L        Stepdowns        STS        Gait Training  9/11 9/14 9/20 10/11                   Modalities  9/11 9/14 9/20 10/11   CP prn 10 min Left Knee  NT Declined  Declined

## 2024-03-21 ENCOUNTER — OFFICE VISIT (OUTPATIENT)
Dept: URGENT CARE | Facility: CLINIC | Age: 34
End: 2024-03-21
Payer: COMMERCIAL

## 2024-03-21 VITALS
WEIGHT: 186 LBS | SYSTOLIC BLOOD PRESSURE: 131 MMHG | HEART RATE: 80 BPM | TEMPERATURE: 96.8 F | BODY MASS INDEX: 30.99 KG/M2 | DIASTOLIC BLOOD PRESSURE: 69 MMHG | RESPIRATION RATE: 16 BRPM | HEIGHT: 65 IN | OXYGEN SATURATION: 100 %

## 2024-03-21 DIAGNOSIS — J06.9 ACUTE URI: ICD-10-CM

## 2024-03-21 DIAGNOSIS — H10.32 ACUTE BACTERIAL CONJUNCTIVITIS OF LEFT EYE: Primary | ICD-10-CM

## 2024-03-21 PROCEDURE — S9088 SERVICES PROVIDED IN URGENT: HCPCS

## 2024-03-21 PROCEDURE — 99213 OFFICE O/P EST LOW 20 MIN: CPT

## 2024-03-21 RX ORDER — FLUTICASONE PROPIONATE 50 MCG
1 SPRAY, SUSPENSION (ML) NASAL DAILY
Qty: 9.9 ML | Refills: 0 | Status: SHIPPED | OUTPATIENT
Start: 2024-03-21

## 2024-03-21 RX ORDER — OFLOXACIN 3 MG/ML
SOLUTION/ DROPS OPHTHALMIC EVERY 4 HOURS
Qty: 5 ML | Refills: 0 | Status: SHIPPED | OUTPATIENT
Start: 2024-03-21 | End: 2024-03-28

## 2024-03-21 RX ORDER — ESCITALOPRAM OXALATE 10 MG/1
TABLET ORAL
COMMUNITY

## 2024-03-21 NOTE — LETTER
March 21, 2024     Patient: Jackelin Kemp   YOB: 1990   Date of Visit: 3/21/2024       To Whom it May Concern:    Jackelin Kemp was seen in my clinic on 3/21/2024. She may return to work on 3/22/2024 .    If you have any questions or concerns, please don't hesitate to call.         Sincerely,          Roshni Lopez PA-C        CC: No Recipients

## 2024-03-21 NOTE — PROGRESS NOTES
[facility]        NAME: Jackelin Kemp is a 34 y.o. female  : 1990    MRN: [id number]  DATE: 2024  TIME: 3:16 PM    Assessment and Plan   Acute bacterial conjunctivitis of left eye [H10.32]  1. Acute bacterial conjunctivitis of left eye  ofloxacin (OCUFLOX) 0.3 % ophthalmic solution      2. Acute URI  fluticasone (FLONASE) 50 mcg/act nasal spray      Supportive care recommended for URI. MucinexDM and flonase recommended.   Antibiotic eye drops prescribed for conjunctivitis.   Patient Instructions     Ophthalmic eye abx given today. Explained contagious nature of pink eye. Avoid rubbing eye and wash hands frequently.    Follow-up with PCP in the next 3-5 days if no improvement.   Go to the ED if symptoms severely worsen.    Chief Complaint     Chief Complaint   Patient presents with    Cold Like Symptoms     Sinus congestion and now painful, goopy, watery left eye. Took Claritin and Montelukast without relief.         History of Present Illness     Jackelin Kemp is a 34 y.o. female presenting to the office today for eye itching. Symptoms have been present for 7 days, and include congestion, left eye redness, left eye discharge.   She has tried Zyrtec, Montelukast for her symptoms, some relief.  Sick contacts include: young children at home  Recent travel: none    Review of Systems     Review of Systems   Constitutional:  Negative for chills and fever.   HENT:  Positive for congestion and sore throat. Negative for ear pain, rhinorrhea, sinus pressure, sinus pain and trouble swallowing.    Eyes:  Positive for discharge and redness. Negative for photophobia, pain and visual disturbance.   Respiratory:  Positive for cough. Negative for shortness of breath, wheezing and stridor.    Gastrointestinal:  Negative for abdominal pain, nausea and vomiting.   Genitourinary: Negative.    Musculoskeletal:  Negative for myalgias.   Skin:  Negative for color change and rash.  Observe for now without intervention. The patient was advised to contact office with any change or worsening of vision.   Neurological:  Negative for seizures and syncope.       Current Medications       Current Outpatient Medications:     cetirizine (ZyrTEC) 10 mg tablet, Take 10 mg by mouth daily, Disp: , Rfl:     fluticasone (FLONASE) 50 mcg/act nasal spray, 1 spray into each nostril daily, Disp: 9.9 mL, Rfl: 0    montelukast (SINGULAIR) 10 mg tablet, Take 10 mg by mouth daily at bedtime, Disp: , Rfl:     ofloxacin (OCUFLOX) 0.3 % ophthalmic solution, Administer 1 drop into the left eye every 4 (four) hours for 2 days, THEN 1 drop 4 (four) times a day for 5 days., Disp: 5 mL, Rfl: 0    Prenatal Vit-Fe Fumarate-FA (PRENATAL VITAMIN PO), Take by mouth, Disp: , Rfl:     escitalopram (LEXAPRO) 10 mg tablet, TAKE ONE DAILY FOR ANXIETY, Disp: , Rfl:     etonogestrel-ethinyl estradiol (NuvaRing) 0.12-0.015 MG/24HR vaginal ring, Insert vaginally and leave in place for 3 consecutive weeks, then remove for 1 week. (Patient not taking: Reported on 3/21/2024), Disp: 3 each, Rfl: 3    norethindrone (Jill) 0.35 MG tablet, Take 1 tablet (0.35 mg total) by mouth daily (Patient not taking: Reported on 3/21/2024), Disp: 90 tablet, Rfl: 3    norgestimate-ethinyl estradiol (Mell) 0.25-35 MG-MCG per tablet, Take 1 tablet by mouth daily (Patient not taking: Reported on 3/21/2024), Disp: 90 tablet, Rfl: 3    Current Allergies     Allergies as of 03/21/2024 - Reviewed 03/21/2024   Allergen Reaction Noted    Pollen extract Swelling 10/30/2009            The following portions of the patient's history were reviewed and updated as appropriate: allergies, current medications, past family history, past medical history, past social history, past surgical history and problem list.     Past Medical History:   Diagnosis Date    Arthritis     Borderline high cholesterol     Collagen disorder (HCC)     arthritis    CTS (carpal tunnel syndrome)     GERD (gastroesophageal reflux disease)     IBS (irritable bowel syndrome)     Urinary tract infection     Varicella   "      Past Surgical History:   Procedure Laterality Date    ADENOIDECTOMY      ANAL FISTULOTOMY  2021    COLONOSCOPY      HEMORRHOID SURGERY  10/30/2020    ND  DELIVERY ONLY N/A 10/4/2022    Procedure:  SECTION ();  Surgeon: Yardlie Toussaint-Foster, DO;  Location: [facility];  Service: Obstetrics    UPPER GASTROINTESTINAL ENDOSCOPY      WISDOM TOOTH EXTRACTION         Family History   Problem Relation Age of Onset    Ulcerative colitis Paternal Grandmother     Diverticulitis Paternal Grandmother     Colon polyps Maternal Aunt     Colon cancer Other     Supraventricular tachycardia Mother     Other Mother         trali antibody    Hyperlipidemia Father     Arthritis Father     Hypertension Father     Hypertension Maternal Grandmother     Heart attack Maternal Grandfather     Colonic polyp Maternal Grandfather     Liver disease Maternal Grandfather     Heart disease Maternal Grandfather     Dementia Paternal Grandfather     No Known Problems Son     Breast cancer Neg Hx     Ovarian cancer Neg Hx        Medications have been verified.    Objective     /69   Pulse 80   Temp (!) 96.8 °F (36 °C) (Tympanic)   Resp 16   Ht 5' 5\" (1.651 m)   Wt 84.4 kg (186 lb)   LMP 2024 (Approximate)   SpO2 100%   BMI 30.95 kg/m²   Patient's last menstrual period was 2024 (approximate).     Physical Exam     Physical Exam  Vitals and nursing note reviewed.   Constitutional:       General: She is not in acute distress.     Appearance: Normal appearance. She is normal weight. She is not ill-appearing, toxic-appearing or diaphoretic.   HENT:      Head: Normocephalic and atraumatic.      Right Ear: Tympanic membrane, ear canal and external ear normal. There is no impacted cerumen.      Left Ear: Tympanic membrane, ear canal and external ear normal. There is no impacted cerumen.      Nose: Congestion and rhinorrhea present.      Mouth/Throat:      Mouth: Mucous membranes are moist.      " Pharynx: Posterior oropharyngeal erythema present. No oropharyngeal exudate.   Eyes:      General: No scleral icterus.        Right eye: No discharge.         Left eye: Discharge (and erythema) present.     Conjunctiva/sclera: Conjunctivae normal.   Cardiovascular:      Rate and Rhythm: Normal rate and regular rhythm.      Pulses: Normal pulses.      Heart sounds: Normal heart sounds. No murmur heard.     No friction rub. No gallop.   Pulmonary:      Effort: Pulmonary effort is normal. No respiratory distress.      Breath sounds: Normal breath sounds. No stridor. No wheezing, rhonchi or rales.   Chest:      Chest wall: No tenderness.   Musculoskeletal:         General: Normal range of motion.      Cervical back: Normal range of motion and neck supple. No rigidity or tenderness.   Lymphadenopathy:      Cervical: No cervical adenopathy.   Skin:     General: Skin is warm and dry.      Capillary Refill: Capillary refill takes less than 2 seconds.      Coloration: Skin is not jaundiced.      Findings: No bruising or rash.   Neurological:      General: No focal deficit present.      Mental Status: She is alert. Mental status is at baseline.   Psychiatric:         Mood and Affect: Mood normal.         Behavior: Behavior normal.

## 2024-06-20 ENCOUNTER — OFFICE VISIT (OUTPATIENT)
Dept: URGENT CARE | Facility: CLINIC | Age: 34
End: 2024-06-20
Payer: COMMERCIAL

## 2024-06-20 VITALS
RESPIRATION RATE: 18 BRPM | TEMPERATURE: 97.8 F | HEART RATE: 75 BPM | BODY MASS INDEX: 31.39 KG/M2 | OXYGEN SATURATION: 100 % | SYSTOLIC BLOOD PRESSURE: 129 MMHG | WEIGHT: 188.4 LBS | DIASTOLIC BLOOD PRESSURE: 68 MMHG | HEIGHT: 65 IN

## 2024-06-20 DIAGNOSIS — L23.9 ALLERGIC CONTACT DERMATITIS, UNSPECIFIED TRIGGER: Primary | ICD-10-CM

## 2024-06-20 PROCEDURE — S9088 SERVICES PROVIDED IN URGENT: HCPCS

## 2024-06-20 PROCEDURE — 99213 OFFICE O/P EST LOW 20 MIN: CPT

## 2024-06-20 RX ORDER — PREDNISONE 20 MG/1
60 TABLET ORAL DAILY
Qty: 15 TABLET | Refills: 0 | Status: SHIPPED | OUTPATIENT
Start: 2024-06-20 | End: 2024-06-25

## 2024-06-20 RX ORDER — TRIAMCINOLONE ACETONIDE 5 MG/G
CREAM TOPICAL 2 TIMES DAILY
Qty: 15 G | Refills: 0 | Status: SHIPPED | OUTPATIENT
Start: 2024-06-20

## 2024-06-20 NOTE — PATIENT INSTRUCTIONS
Take prednisone as prescribed  Use kenalog cream as prescribed    Avoid scratching area  Topical benadryl cream during the day  Cool compresses  Allegra and Pepcid over the counter  Oral benadryl for itching as needed at night  Keep area clean and dry  Watch for signs of infection    Follow up with PCP in 3-5 days.  Proceed to  ER if symptoms worsen.    If tests are performed, our office will contact you with results only if changes need to made to the care plan discussed with you at the visit. [facility]    Contact Dermatitis   WHAT YOU NEED TO KNOW:   Contact dermatitis is a skin rash. It develops when you touch something that irritates your skin or causes an allergic reaction.  DISCHARGE INSTRUCTIONS:   Call your local emergency number (911 in the ) if:   You have sudden trouble breathing.    Your throat swells and you have trouble eating.    Your face is swollen.    Call your doctor or dermatologist if:   You have a fever.    Your blisters are draining pus.    Your rash spreads or does not get better, even after treatment.    You have questions or concerns about your condition or care.    Medicines:   Medicines  help decrease itching and swelling. They will be given as a topical medicine to apply to your rash or as a pill.    Take your medicine as directed.  Contact your healthcare provider if you think your medicine is not helping or if you have side effects. Tell your provider if you are allergic to any medicine. Keep a list of the medicines, vitamins, and herbs you take. Include the amounts, and when and why you take them. Bring the list or the pill bottles to follow-up visits. Carry your medicine list with you in case of an emergency.    Manage contact dermatitis:   Take short baths or showers in cool water.  Use mild soap or soap-free cleansers. Add oatmeal, baking soda, or cornstarch to the bath water to help decrease skin irritation.    Avoid skin irritants ,  such as makeup, hair products, soaps, and cleansers. Use products that do not contain perfume or dye.    Apply a cool compress to your rash.  This will help soothe your skin.    Apply lotions or creams to the area.  These help keep your skin moist and decrease itching. Apply the lotion or cream right after a lukewarm bath or shower when your skin is still damp. Use products that do not contain a scent.    Follow up with your doctor or dermatologist in 2 to 3 days:  Write down your questions so you remember to ask them during your visits.  © Copyright [employer] 2023 Information is for End User's use only and may not be sold, redistributed or otherwise used for commercial purposes.  The above information is an  only. It is not intended as medical advice for individual conditions or treatments. Talk to your doctor, nurse or pharmacist before following any medical regimen to see if it is safe and effective for you.

## 2024-06-20 NOTE — PROGRESS NOTES
[facility]        NAME: Jackelin Kemp is a 34 y.o. female  : 1990    MRN: [id number]  DATE: 2024  TIME: 3:05 PM    Assessment and Plan   Allergic contact dermatitis, unspecified trigger [L23.9]  1. Allergic contact dermatitis, unspecified trigger  predniSONE 20 mg tablet    triamcinolone (KENALOG) 0.5 % cream        Will treat with prednisone and kenalog cream - supportive care reviewed  Follow up with PCP    Patient Instructions     Take prednisone as prescribed  Use kenalog cream as prescribed    Avoid scratching area  Topical benadryl cream during the day  Cool compresses  Allegra and Pepcid over the counter  Oral benadryl for itching as needed at night  Keep area clean and dry  Watch for signs of infection    Follow up with PCP in 3-5 days.  Proceed to  ER if symptoms worsen.    If tests are performed, our office will contact you with results only if changes need to made to the care plan discussed with you at the visit. You can review your full results on [facility].    Chief Complaint     Chief Complaint   Patient presents with    Rash     Got a massage yesterday now has full back rash that is itchy          History of Present Illness       34-year-old female arrives reporting she got a massage yesterday and afterwards started to notice some itching on her back.  Patient reports a rash that is itchy to her back and now feels like it is spreading to her arms.  Patient reports she has been to this massage place before and reports that they have used the same oils and lotions on her.  Patient reports she called the facility and reports no change in any of the lotions used.  Patient denies any other exposures.  Patient denies fevers.    Rash  This is a new problem. The current episode started yesterday. The problem has been gradually worsening since onset. The affected locations include the back, left arm and right arm. The problem is mild. The rash is characterized by  itchiness. It is unknown if there was an exposure to a precipitant. Associated symptoms include itching. Pertinent negatives include no congestion, cough, decreased physical activity, decreased responsiveness, decreased sleep, drinking less, diarrhea, fatigue, fever, joint pain, rhinorrhea, shortness of breath, sore throat or vomiting. Past treatments include anti-itch cream. The treatment provided mild relief. There were no sick contacts.       Review of Systems   Review of Systems   Constitutional:  Negative for chills, decreased responsiveness, fatigue and fever.   HENT:  Negative for congestion, rhinorrhea and sore throat.    Respiratory:  Negative for cough and shortness of breath.    Cardiovascular:  Negative for chest pain.   Gastrointestinal:  Negative for abdominal pain, diarrhea and vomiting.   Musculoskeletal:  Negative for arthralgias, back pain and joint pain.   Skin:  Positive for itching and rash. Negative for color change.   All other systems reviewed and are negative.        Current Medications       Current Outpatient Medications:     cetirizine (ZyrTEC) 10 mg tablet, Take 10 mg by mouth daily, Disp: , Rfl:     escitalopram (LEXAPRO) 10 mg tablet, TAKE ONE DAILY FOR ANXIETY, Disp: , Rfl:     montelukast (SINGULAIR) 10 mg tablet, Take 10 mg by mouth daily at bedtime, Disp: , Rfl:     predniSONE 20 mg tablet, Take 3 tablets (60 mg total) by mouth daily for 5 days, Disp: 15 tablet, Rfl: 0    triamcinolone (KENALOG) 0.5 % cream, Apply topically 2 (two) times a day, Disp: 15 g, Rfl: 0    etonogestrel-ethinyl estradiol (NuvaRing) 0.12-0.015 MG/24HR vaginal ring, Insert vaginally and leave in place for 3 consecutive weeks, then remove for 1 week. (Patient not taking: Reported on 3/21/2024), Disp: 3 each, Rfl: 3    fluticasone (FLONASE) 50 mcg/act nasal spray, 1 spray into each nostril daily (Patient not taking: Reported on 6/20/2024), Disp: 9.9 mL, Rfl: 0    norethindrone (Jill) 0.35 MG tablet, Take 1  tablet (0.35 mg total) by mouth daily (Patient not taking: Reported on 3/21/2024), Disp: 90 tablet, Rfl: 3    norgestimate-ethinyl estradiol (Mell) 0.25-35 MG-MCG per tablet, Take 1 tablet by mouth daily (Patient not taking: Reported on 3/21/2024), Disp: 90 tablet, Rfl: 3    Prenatal Vit-Fe Fumarate-FA (PRENATAL VITAMIN PO), Take by mouth (Patient not taking: Reported on 2024), Disp: , Rfl:     Current Allergies     Allergies as of 2024 - Reviewed 2024   Allergen Reaction Noted    Pollen extract Swelling 10/30/2009            The following portions of the patient's history were reviewed and updated as appropriate: allergies, current medications, past family history, past medical history, past social history, past surgical history and problem list.     Past Medical History:   Diagnosis Date    Arthritis     Borderline high cholesterol     Collagen disorder (HCC)     arthritis    CTS (carpal tunnel syndrome)     GERD (gastroesophageal reflux disease)     IBS (irritable bowel syndrome)     Urinary tract infection     Varicella        Past Surgical History:   Procedure Laterality Date    ADENOIDECTOMY      ANAL FISTULOTOMY  2021    COLONOSCOPY      HEMORRHOID SURGERY  10/30/2020    [place] N/A 10/4/2022    Procedure:  SECTION ();  Surgeon: Yardlie Toussaint-Foster, DO;  Location: [facility];  Service: Obstetrics    UPPER GASTROINTESTINAL ENDOSCOPY      WISDOM TOOTH EXTRACTION         Family History   Problem Relation Age of Onset    Ulcerative colitis Paternal Grandmother     Diverticulitis Paternal Grandmother     Colon polyps Maternal Aunt     Colon cancer Other     Supraventricular tachycardia Mother     Other Mother         trali antibody    Hyperlipidemia Father     Arthritis Father     Hypertension Father     Hypertension Maternal Grandmother     Heart attack Maternal Grandfather     Colonic polyp Maternal Grandfather     Liver disease Maternal Grandfather      "Heart disease Maternal Grandfather     Dementia Paternal Grandfather     No Known Problems Son     Breast cancer Neg Hx     Ovarian cancer Neg Hx          Medications have been verified.        Objective   /68   Pulse 75   Temp 97.8 °F (36.6 °C) (Temporal)   Resp 18   Ht 5' 5\" (1.651 m)   Wt 85.5 kg (188 lb 6.4 oz)   SpO2 100%   BMI 31.35 kg/m²        Physical Exam     Physical Exam  Vitals and nursing note reviewed.   Constitutional:       General: She is not in acute distress.     Appearance: Normal appearance. She is not ill-appearing.   HENT:      Head: Normocephalic.      Right Ear: External ear normal.      Left Ear: External ear normal.      Nose: Nose normal.   Eyes:      Pupils: Pupils are equal, round, and reactive to light.   Cardiovascular:      Rate and Rhythm: Normal rate and regular rhythm.      Pulses: Normal pulses.      Heart sounds: Normal heart sounds.   Pulmonary:      Effort: Pulmonary effort is normal. No respiratory distress.      Breath sounds: Normal breath sounds. No stridor. No wheezing, rhonchi or rales.   Chest:      Chest wall: No tenderness.   Musculoskeletal:         General: Normal range of motion.      Cervical back: Normal range of motion and neck supple.   Lymphadenopathy:      Cervical: No cervical adenopathy.   Skin:     General: Skin is warm and dry.      Capillary Refill: Capillary refill takes less than 2 seconds.      Findings: Rash present. Rash is papular and urticarial.          Neurological:      General: No focal deficit present.      Mental Status: She is alert and oriented to person, place, and time.   Psychiatric:         Mood and Affect: Mood normal.         Behavior: Behavior normal.                   "

## 2024-07-08 ENCOUNTER — OFFICE VISIT (OUTPATIENT)
Dept: URGENT CARE | Facility: CLINIC | Age: 34
End: 2024-07-08
Payer: COMMERCIAL

## 2024-07-08 VITALS
DIASTOLIC BLOOD PRESSURE: 80 MMHG | SYSTOLIC BLOOD PRESSURE: 130 MMHG | BODY MASS INDEX: 30.66 KG/M2 | RESPIRATION RATE: 16 BRPM | OXYGEN SATURATION: 97 % | HEIGHT: 65 IN | WEIGHT: 184 LBS | HEART RATE: 84 BPM | TEMPERATURE: 96.9 F

## 2024-07-08 DIAGNOSIS — J01.00 ACUTE NON-RECURRENT MAXILLARY SINUSITIS: Primary | ICD-10-CM

## 2024-07-08 PROCEDURE — 99213 OFFICE O/P EST LOW 20 MIN: CPT

## 2024-07-08 PROCEDURE — S9088 SERVICES PROVIDED IN URGENT: HCPCS

## 2024-07-08 RX ORDER — AMOXICILLIN AND CLAVULANATE POTASSIUM 875; 125 MG/1; MG/1
1 TABLET, FILM COATED ORAL EVERY 12 HOURS SCHEDULED
Qty: 14 TABLET | Refills: 0 | Status: SHIPPED | OUTPATIENT
Start: 2024-07-08 | End: 2024-07-15

## 2024-07-08 NOTE — PROGRESS NOTES
"  [facility]        NAME: Jackelin eKmp is a 34 y.o. female  : 1990    MRN: [id number]  DATE: 2024  TIME: 4:35 PM    Assessment and Plan   Acute non-recurrent maxillary sinusitis [J01.00]  1. Acute non-recurrent maxillary sinusitis  amoxicillin-clavulanate (AUGMENTIN) 875-125 mg per tablet        Given symptom duration x6 days, will treat sinusitis with Augmentin. Encouraged continued supportive measures.  Follow up with PCP in 3-5 days or proceed to emergency department for worsening symptoms.  Patient verbalized understanding of instructions given.       Patient Instructions     Patient Instructions   Take antibiotic as prescribed  Continue with supportive measures, OTC Tylenol/Ibuprofen, nasal decongestants, and cough suppressants   Cool mist humidifiers, throat lozenges, increased fluid intake and rest   Follow up with PCP in 3-5 days  Present to ER if symptoms worsen       If tests have been performed at [facility], our office will contact you with results if changes need to be made to the care plan discussed with you at the visit.  You can review your full results on [facility] MyChart.      Patient Education     Sinusitis in adults   The Basics   Written by the doctors and editors at [facility]   What is sinusitis? -- Sinusitis is a condition that can cause a stuffy nose, pain in the face, and discharge or \"mucus\" from the nose.  The sinuses are hollow areas in the bones of the face (figure 1). They have a thin lining that normally makes a small amount of mucus. When this lining gets irritated or infected, it swells and makes extra mucus. This causes symptoms.  Sinusitis usually happens after a person gets sick with a cold. The germs causing the cold can infect the sinuses, too. Sometimes, other germs can be the cause of the infection. Often, a person feels like their cold is getting better. But then, they get sinusitis and begin to feel sick again.  What are the symptoms of " sinusitis? -- Common symptoms of sinusitis include:   Stuffy or blocked nose   Thick white, yellow, or green discharge from the nose   Pain in the teeth   Pain or pressure in the face - This often feels worse when a person bends forward.  People with sinusitis can also have other symptoms, such as:   Fever   Cough   Trouble smelling   Ear pressure or fullness   Headache   Bad breath   Feeling tired  Most of the time, symptoms start to improve in 7 to 10 days.  Should I see a doctor or nurse? -- See your doctor or nurse if your symptoms last more than 10 days, or if your symptoms first get better but then get worse.  Rarely, sinusitis can lead to serious problems. See your doctor or nurse right away (do not wait 10 days) if you have:   Fever higher than 102°F (38.9°C)   Sudden and severe pain in the face and head   Trouble seeing, or seeing double   Trouble thinking clearly   Swelling or redness around 1 or both eyes   Stiff neck  Is there anything I can do on my own to feel better? -- Yes. To help with your symptoms, you can:   Take an over-the-counter pain reliever to reduce the pain.   Rinse your nose and sinuses with salt water a few times a day - Ask your doctor or nurse about the best way to do this.   Drink plenty of fluids - Staying hydrated might help to thin the mucus and make it drain more easily.  Your doctor might also recommend a steroid nose spray to reduce the swelling in your nose, especially if you have allergies. Talk to your doctor if you are thinking of using a steroid spray.  How is sinusitis treated? -- Most of the time, sinusitis does not need to be treated with antibiotic medicines. This is because most sinusitis is caused by viruses, not bacteria, and antibiotics do not kill viruses. In fact, even sinusitis caused by bacteria will usually get better on its own without antibiotics.  Some people with sinusitis do need treatment with antibiotics. If your symptoms have not improved after 10  "days, ask your doctor if you should take antibiotics. They might recommend that you wait 1 more week to see if your symptoms improve. But if you have symptoms such as a fever or a lot of pain, they might prescribe antibiotics. If you do get antibiotics, follow all of your doctor's instructions about taking them.  What if my symptoms do not get better? -- If your symptoms do not get better, talk with your doctor or nurse. They might order tests to figure out why you still have symptoms. These can include:   CT scan or other imaging tests - Imaging tests create pictures of the inside of the body.   A test to look inside the sinuses - For this test, a doctor puts a thin tube with a camera on the end into the nose and up into the sinuses.  Some people get a lot of sinus infections or have symptoms that last at least 3 months. These people can have a different type of sinusitis called \"chronic sinusitis.\" Chronic sinusitis can be caused by different things. For example, some people have growths inside their nose or sinuses that are called \"polyps.\" Other people have allergies that cause their symptoms.  Chronic sinusitis can be treated in different ways. If you have chronic sinusitis, talk with your doctor about which treatments are right for you.  All topics are updated as new evidence becomes available and our peer review process is complete.  This topic retrieved from [employer] on: Feb 28, 2024.  Topic 60432 Version 21.0  Release: 32.2.4 - C32.58  © 2024 UpToDate, Inc. and/or its affiliates. All rights reserved.  figure 1: Sinuses of the face     The sinuses are hollow areas in the bones of the face. This drawing shows where the sinuses are, from the side and front views. There are 4 pairs of sinuses, named for the bones around them: sphenoid, frontal, ethmoid, and maxillary.  Graphic 700839 Version 3.0  Consumer Information Use and Disclaimer   Disclaimer: This generalized information is a limited summary of " diagnosis, treatment, and/or medication information. It is not meant to be comprehensive and should be used as a tool to help the user understand and/or assess potential diagnostic and treatment options. It does NOT include all information about conditions, treatments, medications, side effects, or risks that may apply to a specific patient. It is not intended to be medical advice or a substitute for the medical advice, diagnosis, or treatment of a health care provider based on the health care provider's examination and assessment of a patient's specific and unique circumstances. Patients must speak with a health care provider for complete information about their health, medical questions, and treatment options, including any risks or benefits regarding use of medications. This information does not endorse any treatments or medications as safe, effective, or approved for treating a specific patient. UpToDate, Inc. and its affiliates disclaim any warranty or liability relating to this information or the use thereof.The use of this information is governed by the Terms of Use, available at https://www.[employer].com/en/know/clinical-effectiveness-terms. 2024© UpToDate, Inc. and its affiliates and/or licensors. All rights reserved.  Copyright   © 2024 UpToDate, Inc. and/or its affiliates. All rights reserved.        Chief Complaint     Chief Complaint   Patient presents with    Nasal Congestion     Sinus congestion, post nasal drip, sore throat started 5 days ago.          History of Present Illness       34-year-old female with no significant past medical history presents with complaints of ongoing nasal congestion, sinus pressure, postnasal drip, and sore throat x 6 days.  Denies fever, chills, cough, vomiting, or diarrhea.  Has been taking OTC Mucinex and using a nasal spray with mild relief.  States positive sick contact/exposure as has been previously ill with similar symptoms.        Review of Systems   Review  of Systems   Constitutional:  Negative for chills and fever.   HENT:  Positive for congestion, postnasal drip, rhinorrhea, sinus pressure, sinus pain and sore throat. Negative for ear discharge, ear pain, trouble swallowing and voice change.    Eyes:  Negative for discharge.   Respiratory:  Negative for cough, shortness of breath and wheezing.    Cardiovascular:  Negative for chest pain.   Gastrointestinal:  Negative for abdominal pain, diarrhea, nausea and vomiting.   Skin:  Negative for rash.         Current Medications       Current Outpatient Medications:     amoxicillin-clavulanate (AUGMENTIN) 875-125 mg per tablet, Take 1 tablet by mouth every 12 (twelve) hours for 7 days, Disp: 14 tablet, Rfl: 0    cetirizine (ZyrTEC) 10 mg tablet, Take 10 mg by mouth daily, Disp: , Rfl:     escitalopram (LEXAPRO) 10 mg tablet, TAKE ONE DAILY FOR ANXIETY, Disp: , Rfl:     montelukast (SINGULAIR) 10 mg tablet, Take 10 mg by mouth daily at bedtime, Disp: , Rfl:     etonogestrel-ethinyl estradiol (NuvaRing) 0.12-0.015 MG/24HR vaginal ring, Insert vaginally and leave in place for 3 consecutive weeks, then remove for 1 week. (Patient not taking: Reported on 3/21/2024), Disp: 3 each, Rfl: 3    fluticasone (FLONASE) 50 mcg/act nasal spray, 1 spray into each nostril daily (Patient not taking: Reported on 6/20/2024), Disp: 9.9 mL, Rfl: 0    norethindrone (Jill) 0.35 MG tablet, Take 1 tablet (0.35 mg total) by mouth daily (Patient not taking: Reported on 3/21/2024), Disp: 90 tablet, Rfl: 3    norgestimate-ethinyl estradiol (Mell) 0.25-35 MG-MCG per tablet, Take 1 tablet by mouth daily (Patient not taking: Reported on 3/21/2024), Disp: 90 tablet, Rfl: 3    Prenatal Vit-Fe Fumarate-FA (PRENATAL VITAMIN PO), Take by mouth (Patient not taking: Reported on 6/20/2024), Disp: , Rfl:     triamcinolone (KENALOG) 0.5 % cream, Apply topically 2 (two) times a day (Patient not taking: Reported on 7/8/2024), Disp: 15 g, Rfl: 0    Current Allergies  "    Allergies as of 2024 - Reviewed 2024   Allergen Reaction Noted    Pollen extract Swelling 10/30/2009            The following portions of the patient's history were reviewed and updated as appropriate: allergies, current medications, past family history, past medical history, past social history, past surgical history and problem list.     Past Medical History:   Diagnosis Date    Arthritis     Borderline high cholesterol     Collagen disorder (HCC)     arthritis    CTS (carpal tunnel syndrome)     GERD (gastroesophageal reflux disease)     IBS (irritable bowel syndrome)     Urinary tract infection     Varicella        Past Surgical History:   Procedure Laterality Date    ADENOIDECTOMY      ANAL FISTULOTOMY  2021    COLONOSCOPY      HEMORRHOID SURGERY  10/30/2020    [place]  DELIVERY ONLY N/A 10/4/2022    Procedure:  SECTION ();  Surgeon: Yardlie Toussaint-Foster, DO;  Location: [facility];  Service: Obstetrics    UPPER GASTROINTESTINAL ENDOSCOPY      WISDOM TOOTH EXTRACTION         Family History   Problem Relation Age of Onset    Ulcerative colitis Paternal Grandmother     Diverticulitis Paternal Grandmother     Colon polyps Maternal Aunt     Colon cancer Other     Supraventricular tachycardia Mother     Other Mother         trali antibody    Hyperlipidemia Father     Arthritis Father     Hypertension Father     Hypertension Maternal Grandmother     Heart attack Maternal Grandfather     Colonic polyp Maternal Grandfather     Liver disease Maternal Grandfather     Heart disease Maternal Grandfather     Dementia Paternal Grandfather     No Known Problems Son     Breast cancer Neg Hx     Ovarian cancer Neg Hx          Medications have been verified.        Objective   /80   Pulse 84   Temp (!) 96.9 °F (36.1 °C)   Resp 16   Ht 5' 5\" (1.651 m)   Wt 83.5 kg (184 lb)   LMP 2024   SpO2 97%   BMI 30.62 kg/m²   Patient's last menstrual period was 2024.     "   Physical Exam     Physical Exam  Vitals and nursing note reviewed.   Constitutional:       General: She is not in acute distress.     Appearance: She is not toxic-appearing.   HENT:      Head: Normocephalic.      Right Ear: Tympanic membrane, ear canal and external ear normal.      Left Ear: Tympanic membrane, ear canal and external ear normal.      Nose: Congestion present.      Right Sinus: Maxillary sinus tenderness present. No frontal sinus tenderness.      Left Sinus: Maxillary sinus tenderness present. No frontal sinus tenderness.      Mouth/Throat:      Mouth: Mucous membranes are moist.      Pharynx: Oropharynx is clear.   Eyes:      Conjunctiva/sclera: Conjunctivae normal.   Cardiovascular:      Rate and Rhythm: Normal rate and regular rhythm.      Heart sounds: Normal heart sounds.   Pulmonary:      Effort: Pulmonary effort is normal. No respiratory distress.      Breath sounds: Normal breath sounds. No stridor. No wheezing, rhonchi or rales.   Lymphadenopathy:      Cervical: No cervical adenopathy.   Skin:     General: Skin is warm and dry.   Neurological:      Mental Status: She is alert and oriented to person, place, and time.      Gait: Gait is intact.   Psychiatric:         Mood and Affect: Mood normal.         Behavior: Behavior normal.

## 2024-07-08 NOTE — PATIENT INSTRUCTIONS
"Take antibiotic as prescribed  Continue with supportive measures, OTC Tylenol/Ibuprofen, nasal decongestants, and cough suppressants   Cool mist humidifiers, throat lozenges, increased fluid intake and rest   Follow up with PCP in 3-5 days  Present to ER if symptoms worsen       If tests have been performed at [facility], our office will contact you with results if changes need to be made to the care plan discussed with you at the visit.  You can review your full results on [facility] MyChart.      Patient Education     Sinusitis in adults   The Basics   Written by the doctors and editors at [facility]   What is sinusitis? -- Sinusitis is a condition that can cause a stuffy nose, pain in the face, and discharge or \"mucus\" from the nose.  The sinuses are hollow areas in the bones of the face (figure 1). They have a thin lining that normally makes a small amount of mucus. When this lining gets irritated or infected, it swells and makes extra mucus. This causes symptoms.  Sinusitis usually happens after a person gets sick with a cold. The germs causing the cold can infect the sinuses, too. Sometimes, other germs can be the cause of the infection. Often, a person feels like their cold is getting better. But then, they get sinusitis and begin to feel sick again.  What are the symptoms of sinusitis? -- Common symptoms of sinusitis include:   Stuffy or blocked nose   Thick white, yellow, or green discharge from the nose   Pain in the teeth   Pain or pressure in the face - This often feels worse when a person bends forward.  People with sinusitis can also have other symptoms, such as:   Fever   Cough   Trouble smelling   Ear pressure or fullness   Headache   Bad breath   Feeling tired  Most of the time, symptoms start to improve in 7 to 10 days.  Should I see a doctor or nurse? -- See your doctor or nurse if your symptoms last more than 10 days, or if your symptoms first get better but then get worse.  Rarely, sinusitis can " lead to serious problems. See your doctor or nurse right away (do not wait 10 days) if you have:   Fever higher than 102°F (38.9°C)   Sudden and severe pain in the face and head   Trouble seeing, or seeing double   Trouble thinking clearly   Swelling or redness around 1 or both eyes   Stiff neck  Is there anything I can do on my own to feel better? -- Yes. To help with your symptoms, you can:   Take an over-the-counter pain reliever to reduce the pain.   Rinse your nose and sinuses with salt water a few times a day - Ask your doctor or nurse about the best way to do this.   Drink plenty of fluids - Staying hydrated might help to thin the mucus and make it drain more easily.  Your doctor might also recommend a steroid nose spray to reduce the swelling in your nose, especially if you have allergies. Talk to your doctor if you are thinking of using a steroid spray.  How is sinusitis treated? -- Most of the time, sinusitis does not need to be treated with antibiotic medicines. This is because most sinusitis is caused by viruses, not bacteria, and antibiotics do not kill viruses. In fact, even sinusitis caused by bacteria will usually get better on its own without antibiotics.  Some people with sinusitis do need treatment with antibiotics. If your symptoms have not improved after 10 days, ask your doctor if you should take antibiotics. They might recommend that you wait 1 more week to see if your symptoms improve. But if you have symptoms such as a fever or a lot of pain, they might prescribe antibiotics. If you do get antibiotics, follow all of your doctor's instructions about taking them.  What if my symptoms do not get better? -- If your symptoms do not get better, talk with your doctor or nurse. They might order tests to figure out why you still have symptoms. These can include:   CT scan or other imaging tests - Imaging tests create pictures of the inside of the body.   A test to look inside the sinuses - For this  "test, a doctor puts a thin tube with a camera on the end into the nose and up into the sinuses.  Some people get a lot of sinus infections or have symptoms that last at least 3 months. These people can have a different type of sinusitis called \"chronic sinusitis.\" Chronic sinusitis can be caused by different things. For example, some people have growths inside their nose or sinuses that are called \"polyps.\" Other people have allergies that cause their symptoms.  Chronic sinusitis can be treated in different ways. If you have chronic sinusitis, talk with your doctor about which treatments are right for you.  All topics are updated as new evidence becomes available and our peer review process is complete.  This topic retrieved from [employer] on: Feb 28, 2024.  Topic 09889 Version 21.0  Release: 32.2.4 - C32.58  © 2024 UpToDate, Inc. and/or its affiliates. All rights reserved.  figure 1: Sinuses of the face     The sinuses are hollow areas in the bones of the face. This drawing shows where the sinuses are, from the side and front views. There are 4 pairs of sinuses, named for the bones around them: sphenoid, frontal, ethmoid, and maxillary.  Graphic 473028 Version 3.0  Consumer Information Use and Disclaimer   Disclaimer: This generalized information is a limited summary of diagnosis, treatment, and/or medication information. It is not meant to be comprehensive and should be used as a tool to help the user understand and/or assess potential diagnostic and treatment options. It does NOT include all information about conditions, treatments, medications, side effects, or risks that may apply to a specific patient. It is not intended to be medical advice or a substitute for the medical advice, diagnosis, or treatment of a health care provider based on the health care provider's examination and assessment of a patient's specific and unique circumstances. Patients must speak with a health care provider for complete " information about their health, medical questions, and treatment options, including any risks or benefits regarding use of medications. This information does not endorse any treatments or medications as safe, effective, or approved for treating a specific patient. UpToDate, Inc. and its affiliates disclaim any warranty or liability relating to this information or the use thereof.The use of this information is governed by the Terms of Use, available at https://www.woltersGenciauwer.com/en/know/clinical-effectiveness-terms. 2024© UpToDate, Inc. and its affiliates and/or licensors. All rights reserved.  Copyright   © 2024 UpToDate, Inc. and/or its affiliates. All rights reserved.

## 2024-08-23 ENCOUNTER — ANNUAL EXAM (OUTPATIENT)
Dept: OBGYN CLINIC | Facility: MEDICAL CENTER | Age: 34
End: 2024-08-23
Payer: COMMERCIAL

## 2024-08-23 VITALS
DIASTOLIC BLOOD PRESSURE: 70 MMHG | BODY MASS INDEX: 29.99 KG/M2 | WEIGHT: 180 LBS | HEIGHT: 65 IN | SYSTOLIC BLOOD PRESSURE: 124 MMHG

## 2024-08-23 DIAGNOSIS — Z01.419 ENCOUNTER FOR WELL WOMAN EXAM WITH ROUTINE GYNECOLOGICAL EXAM: Primary | ICD-10-CM

## 2024-08-23 PROCEDURE — 99395 PREV VISIT EST AGE 18-39: CPT | Performed by: STUDENT IN AN ORGANIZED HEALTH CARE EDUCATION/TRAINING PROGRAM

## 2024-08-23 PROCEDURE — G0476 HPV COMBO ASSAY CA SCREEN: HCPCS | Performed by: STUDENT IN AN ORGANIZED HEALTH CARE EDUCATION/TRAINING PROGRAM

## 2024-08-23 PROCEDURE — G0145 SCR C/V CYTO,THINLAYER,RESCR: HCPCS | Performed by: STUDENT IN AN ORGANIZED HEALTH CARE EDUCATION/TRAINING PROGRAM

## 2024-08-23 NOTE — PROGRESS NOTES
[facility] [address], [place], [place]    ASSESSMENT/PLAN: Jackelin Kemp is a 34 y.o.  who presents for annual gynecologic exam.    Encounter for routine gynecologic examination  - Routine well woman exam completed today.  - Cervical Cancer Screening: Current ASCCP Guidelines reviewed. Last Pap: 2020. Co-testing done today.  - HPV Vaccination status: Immunization series complete  - STI screening offered including HIV testing: Declined  - Contraceptive counseling discussed.  Current contraception: partner vasectomy.  - discussed unilateral right breast pain, resolved; option for imaging especially if pain returns.    Additional problems addressed during this visit:  1. Encounter for well woman exam with routine gynecological exam  -     Liquid-based pap, screening      CC:  Annual Gynecologic Examination    HPI: Jackelin Kemp is a 34 y.o.  who presents for annual gynecologic examination.  She reports  no new changes to her health.  She reports unilateral breast pain that resolved. She gets regular periods. She has no vaginal discharge, vulvar or vaginal lesions, pelvic pain, or abnormal bleeding.  She has no sexual health concerns and is currently sexually active with one male partner.  She contracepts with . She has no symptoms of pelvic organ prolapse, urinary, or fecal incontinence.  She denies intimate partner violence.            The following portions of the patient's history were reviewed and updated as appropriate: allergies, current medications, past family history, past medical history, obstetric history, gynecologic history, past social history, past surgical history and problem list.    Review of Systems   Constitutional:  Negative for chills and fever.   Respiratory:  Negative for cough and shortness of breath.    Cardiovascular:  Negative for chest pain and leg swelling.   Gastrointestinal:  Negative for abdominal pain, nausea  "and vomiting.   Genitourinary:  Negative for dysuria, frequency and urgency.   Neurological:  Negative for dizziness, light-headedness and headaches.         Objective:  /70   Ht 5' 5\" (1.651 m)   Wt 81.6 kg (180 lb)   LMP 07/23/2024 (Exact Date)   BMI 29.95 kg/m²    Physical Exam  Chaperone present: chaparone offered, patient declined.   Constitutional:       Appearance: Normal appearance.   HENT:      Head: Normocephalic and atraumatic.   Cardiovascular:      Rate and Rhythm: Normal rate.   Pulmonary:      Effort: Pulmonary effort is normal.   Chest:   Breasts:     Breasts are symmetrical.      Right: Normal. No swelling, bleeding, inverted nipple, mass, nipple discharge, skin change or tenderness.      Left: Normal. No swelling, bleeding, inverted nipple, mass, nipple discharge, skin change or tenderness.   Abdominal:      General: There is no distension.      Tenderness: There is no abdominal tenderness. There is no guarding.   Genitourinary:     Exam position: Lithotomy position.      Pubic Area: No rash.       Labia:         Right: No rash, tenderness or lesion.         Left: No rash, tenderness or lesion.       Urethra: No prolapse, urethral swelling or urethral lesion.      Vagina: Normal. No vaginal discharge, erythema, tenderness, bleeding or lesions.      Cervix: No cervical motion tenderness, discharge, friability or erythema.      Uterus: Not enlarged, not tender and no uterine prolapse.       Adnexa:         Right: No mass, tenderness or fullness.          Left: No mass, tenderness or fullness.     Lymphadenopathy:      Upper Body:      Right upper body: No axillary adenopathy.      Left upper body: No axillary adenopathy.      Lower Body: No right inguinal adenopathy. No left inguinal adenopathy.   Neurological:      Mental Status: She is alert.           Razia Escobar MD  [facility]  [facility]  8/23/2024 4:10 PM    "

## 2024-08-27 LAB
HPV HR 12 DNA CVX QL NAA+PROBE: NEGATIVE
HPV16 DNA CVX QL NAA+PROBE: NEGATIVE
HPV18 DNA CVX QL NAA+PROBE: NEGATIVE

## 2024-08-29 LAB
LAB AP GYN PRIMARY INTERPRETATION: NORMAL
Lab: NORMAL
PATH INTERP SPEC-IMP: NORMAL

## 2025-02-12 ENCOUNTER — OFFICE VISIT (OUTPATIENT)
Dept: URGENT CARE | Facility: CLINIC | Age: 35
End: 2025-02-12
Payer: COMMERCIAL

## 2025-02-12 VITALS
DIASTOLIC BLOOD PRESSURE: 84 MMHG | SYSTOLIC BLOOD PRESSURE: 128 MMHG | HEART RATE: 69 BPM | TEMPERATURE: 96.5 F | RESPIRATION RATE: 18 BRPM | HEIGHT: 65 IN | OXYGEN SATURATION: 99 % | BODY MASS INDEX: 28.89 KG/M2 | WEIGHT: 173.4 LBS

## 2025-02-12 DIAGNOSIS — J06.9 VIRAL URI: Primary | ICD-10-CM

## 2025-02-12 DIAGNOSIS — B30.9 VIRAL CONJUNCTIVITIS: ICD-10-CM

## 2025-02-12 PROCEDURE — 99213 OFFICE O/P EST LOW 20 MIN: CPT | Performed by: PHYSICIAN ASSISTANT

## 2025-02-12 PROCEDURE — S9088 SERVICES PROVIDED IN URGENT: HCPCS | Performed by: PHYSICIAN ASSISTANT

## 2025-02-12 RX ORDER — BUPROPION HYDROCHLORIDE 100 MG/1
100 TABLET, EXTENDED RELEASE ORAL DAILY
COMMUNITY
Start: 2025-01-13

## 2025-02-12 NOTE — PROGRESS NOTES
[facility]        NAME: Jackelin Kemp is a 35 y.o. female  : 1990    MRN: [id number]  DATE: 2025  TIME: 1:43 PM    Assessment and Plan   Viral URI [J06.9]  1. Viral URI        2. Viral conjunctivitis              Patient Instructions   Over-the-counter cold and flu medications as needed for symptoms.  Drink plenty of fluids.  Follow-up with the PCP in 3 to 5 days if her symptoms do not improve.  Go to ER if symptoms become severe.    Over-the-counter cold medication 101:  - Guaifenesin (ex. Mucinex) is a mucus thinner.  Good for sinus or chest congestion.  Works best if you drink plenty of fluids.  -Dextromethorphan (ex. Delsym, Mucinex DM) as a cough suppressant  -Pseudoephedrine (ex. Sudaphed) is a decongestion and should not be used by those with high blood pressure or heart problems.  -Flonase is a intranasal steroid.  Good for sinus congestion and postnasal drip.  -Antihistamines (Claritin, Zyrtec, Allegra Benadryl) are used for allergies as well as colds for treatment of congestion and ear fullness.      Follow up with PCP in 3-5 days.  Proceed to  ER if symptoms worsen.    If tests have been performed at [facility], our office will contact you with results if changes need to be made to the care plan discussed with you at the visit.  [facility]    Chief Complaint     Chief Complaint   Patient presents with    Cold Like Symptoms     Sore throat, headache, and blocked ears starting 3 days ago. Right eye irritation starting yesterday.          History of Present Illness       Patient a 35-year-old female with no significant past medical history presents the office c/o  of headache, sore throat, ear fullness, and cough for 3 days.  States her right eye became irritated and red.  Denies itchiness, discharge, vision changes, or foreign body sensation.  Does not wear corrective lenses.        Review of Systems   Review of Systems    Constitutional:  Negative for chills and fever.   HENT:  Positive for ear pain and sore throat. Negative for congestion.    Eyes:  Positive for pain. Negative for photophobia, discharge, redness, itching and visual disturbance.   Respiratory:  Negative for cough and shortness of breath.    Cardiovascular:  Negative for chest pain and palpitations.   Gastrointestinal:  Negative for abdominal pain, diarrhea, nausea and vomiting.   Musculoskeletal:  Negative for myalgias.   Neurological:  Positive for headaches. Negative for dizziness and light-headedness.         Current Medications       Current Outpatient Medications:     buPROPion (WELLBUTRIN SR) 100 mg 12 hr tablet, Take 100 mg by mouth daily, Disp: , Rfl:     escitalopram (LEXAPRO) 10 mg tablet, TAKE ONE DAILY FOR ANXIETY, Disp: , Rfl:     omeprazole (PriLOSEC) 20 mg delayed release capsule, TAKE 1 CAPSULE BY MOUTH IN THE MORNING. 1 HOUR BEFORE THE FIRST MEAL OF THE DAY., Disp: , Rfl:     fluticasone (FLONASE) 50 mcg/act nasal spray, 1 spray into each nostril daily (Patient not taking: Reported on 2/12/2025), Disp: 9.9 mL, Rfl: 0    montelukast (SINGULAIR) 10 mg tablet, Take 10 mg by mouth daily at bedtime (Patient not taking: Reported on 2/12/2025), Disp: , Rfl:     Current Allergies     Allergies as of 02/12/2025 - Reviewed 02/12/2025   Allergen Reaction Noted    Pollen extract Swelling 10/30/2009            The following portions of the patient's history were reviewed and updated as appropriate: allergies, current medications, past family history, past medical history, past social history, past surgical history and problem list.     Past Medical History:   Diagnosis Date    Arthritis     Borderline high cholesterol     Collagen disorder (HCC)     arthritis    CTS (carpal tunnel syndrome)     GERD (gastroesophageal reflux disease)     IBS (irritable bowel syndrome)     Urinary tract infection     Varicella        Past Surgical History:   Procedure Laterality  "Date    ADENOIDECTOMY      ANAL FISTULOTOMY  2021    COLONOSCOPY      HEMORRHOID SURGERY  10/30/2020    [place]  DELIVERY ONLY N/A 10/4/2022    Procedure:  SECTION ();  Surgeon: Yardlie Toussaint-Foster, DO;  Location: [facility];  Service: Obstetrics    UPPER GASTROINTESTINAL ENDOSCOPY      WISDOM TOOTH EXTRACTION         Family History   Problem Relation Age of Onset    Ulcerative colitis Paternal Grandmother     Diverticulitis Paternal Grandmother     Colon polyps Maternal Aunt     Colon cancer Other     Supraventricular tachycardia Mother     Other Mother         trali antibody    Hyperlipidemia Father     Arthritis Father     Hypertension Father     Hypertension Maternal Grandmother     Heart attack Maternal Grandfather     Colonic polyp Maternal Grandfather     Liver disease Maternal Grandfather     Heart disease Maternal Grandfather     Dementia Paternal Grandfather     No Known Problems Son     Breast cancer Neg Hx     Ovarian cancer Neg Hx          Medications have been verified.        Objective   /84   Pulse 69   Temp (!) 96.5 °F (35.8 °C)   Resp 18   Ht 5' 5\" (1.651 m)   Wt 78.7 kg (173 lb 6.4 oz)   LMP 2025   SpO2 99%   BMI 28.86 kg/m²   Patient's last menstrual period was 2025.       Physical Exam     Physical Exam  Vitals and nursing note reviewed.   Constitutional:       Appearance: Normal appearance. She is well-developed.   HENT:      Head: Normocephalic and atraumatic.      Right Ear: Tympanic membrane, ear canal and external ear normal.      Left Ear: Tympanic membrane, ear canal and external ear normal.      Nose: Nose normal.      Mouth/Throat:      Pharynx: Uvula midline.   Eyes:      General: Lids are normal.      Extraocular Movements: Extraocular movements intact.      Conjunctiva/sclera:      Right eye: Right conjunctiva is injected (Inner aspect only).      Pupils: Pupils are equal, round, and reactive to light.   Cardiovascular:      Rate " and Rhythm: Normal rate and regular rhythm.      Pulses: Normal pulses.      Heart sounds: Normal heart sounds. No murmur heard.     No friction rub. No gallop.   Pulmonary:      Effort: Pulmonary effort is normal.      Breath sounds: Normal breath sounds. No wheezing, rhonchi or rales.   Musculoskeletal:         General: Normal range of motion.      Cervical back: Neck supple.   Lymphadenopathy:      Cervical: No cervical adenopathy.   Skin:     General: Skin is warm and dry.      Capillary Refill: Capillary refill takes less than 2 seconds.   Neurological:      Mental Status: She is alert.

## 2025-05-01 ENCOUNTER — APPOINTMENT (EMERGENCY)
Dept: CT IMAGING | Facility: HOSPITAL | Age: 35
End: 2025-05-01
Payer: COMMERCIAL

## 2025-05-01 ENCOUNTER — HOSPITAL ENCOUNTER (EMERGENCY)
Facility: HOSPITAL | Age: 35
Discharge: HOME | End: 2025-05-01
Attending: EMERGENCY MEDICINE
Payer: COMMERCIAL

## 2025-05-01 VITALS
WEIGHT: 169.75 LBS | TEMPERATURE: 97.7 F | RESPIRATION RATE: 16 BRPM | OXYGEN SATURATION: 100 % | HEART RATE: 77 BPM | BODY MASS INDEX: 28.28 KG/M2 | DIASTOLIC BLOOD PRESSURE: 57 MMHG | SYSTOLIC BLOOD PRESSURE: 111 MMHG | HEIGHT: 65 IN

## 2025-05-01 DIAGNOSIS — R51.9 NONINTRACTABLE HEADACHE, UNSPECIFIED CHRONICITY PATTERN, UNSPECIFIED HEADACHE TYPE: Primary | ICD-10-CM

## 2025-05-01 LAB
ALBUMIN SERPL BCG-MCNC: 4.3 G/DL (ref 3.5–5)
ALP SERPL-CCNC: 50 U/L (ref 34–104)
ALT SERPL W P-5'-P-CCNC: 12 U/L (ref 7–52)
ANION GAP SERPL CALCULATED.3IONS-SCNC: 8 MMOL/L (ref 4–13)
AST SERPL W P-5'-P-CCNC: 13 U/L (ref 13–39)
B-HCG SERPL-ACNC: <0.6 MIU/ML (ref 0–5)
BASOPHILS # BLD AUTO: 0.03 THOUSANDS/ÂΜL (ref 0–0.1)
BASOPHILS NFR BLD AUTO: 0 % (ref 0–1)
BILIRUB SERPL-MCNC: 0.72 MG/DL (ref 0.2–1)
BUN SERPL-MCNC: 9 MG/DL (ref 5–25)
CALCIUM SERPL-MCNC: 9.2 MG/DL (ref 8.4–10.2)
CHLORIDE SERPL-SCNC: 107 MMOL/L (ref 96–108)
CO2 SERPL-SCNC: 24 MMOL/L (ref 21–32)
CREAT SERPL-MCNC: 0.82 MG/DL (ref 0.6–1.3)
EOSINOPHIL # BLD AUTO: 0.25 THOUSAND/ÂΜL (ref 0–0.61)
EOSINOPHIL NFR BLD AUTO: 3 % (ref 0–6)
ERYTHROCYTE [DISTWIDTH] IN BLOOD BY AUTOMATED COUNT: 12.8 % (ref 11.6–15.1)
EXT PREGNANCY TEST URINE: NEGATIVE
EXT. CONTROL: NORMAL
GFR SERPL CREATININE-BSD FRML MDRD: 93 ML/MIN/1.73SQ M
GLUCOSE SERPL-MCNC: 105 MG/DL (ref 65–140)
HCT VFR BLD AUTO: 43.5 % (ref 34.8–46.1)
HGB BLD-MCNC: 14.4 G/DL (ref 11.5–15.4)
IMM GRANULOCYTES # BLD AUTO: 0.02 THOUSAND/UL (ref 0–0.2)
IMM GRANULOCYTES NFR BLD AUTO: 0 % (ref 0–2)
LYMPHOCYTES # BLD AUTO: 1.86 THOUSANDS/ÂΜL (ref 0.6–4.47)
LYMPHOCYTES NFR BLD AUTO: 22 % (ref 14–44)
MCH RBC QN AUTO: 28.7 PG (ref 26.8–34.3)
MCHC RBC AUTO-ENTMCNC: 33.1 G/DL (ref 31.4–37.4)
MCV RBC AUTO: 87 FL (ref 82–98)
MONOCYTES # BLD AUTO: 0.48 THOUSAND/ÂΜL (ref 0.17–1.22)
MONOCYTES NFR BLD AUTO: 6 % (ref 4–12)
NEUTROPHILS # BLD AUTO: 5.83 THOUSANDS/ÂΜL (ref 1.85–7.62)
NEUTS SEG NFR BLD AUTO: 69 % (ref 43–75)
NRBC BLD AUTO-RTO: 0 /100 WBCS
PLATELET # BLD AUTO: 271 THOUSANDS/UL (ref 149–390)
PMV BLD AUTO: 10.3 FL (ref 8.9–12.7)
POTASSIUM SERPL-SCNC: 3.7 MMOL/L (ref 3.5–5.3)
PROT SERPL-MCNC: 7 G/DL (ref 6.4–8.4)
RBC # BLD AUTO: 5.01 MILLION/UL (ref 3.81–5.12)
SODIUM SERPL-SCNC: 139 MMOL/L (ref 135–147)
WBC # BLD AUTO: 8.47 THOUSAND/UL (ref 4.31–10.16)

## 2025-05-01 PROCEDURE — 81025 URINE PREGNANCY TEST: CPT | Performed by: EMERGENCY MEDICINE

## 2025-05-01 PROCEDURE — 72125 CT NECK SPINE W/O DYE: CPT

## 2025-05-01 PROCEDURE — 36415 COLL VENOUS BLD VENIPUNCTURE: CPT | Performed by: EMERGENCY MEDICINE

## 2025-05-01 PROCEDURE — 84702 CHORIONIC GONADOTROPIN TEST: CPT | Performed by: EMERGENCY MEDICINE

## 2025-05-01 PROCEDURE — 96365 THER/PROPH/DIAG IV INF INIT: CPT

## 2025-05-01 PROCEDURE — 99284 EMERGENCY DEPT VISIT MOD MDM: CPT

## 2025-05-01 PROCEDURE — 99284 EMERGENCY DEPT VISIT MOD MDM: CPT | Performed by: EMERGENCY MEDICINE

## 2025-05-01 PROCEDURE — 96375 TX/PRO/DX INJ NEW DRUG ADDON: CPT

## 2025-05-01 PROCEDURE — 70450 CT HEAD/BRAIN W/O DYE: CPT

## 2025-05-01 PROCEDURE — 85025 COMPLETE CBC W/AUTO DIFF WBC: CPT | Performed by: EMERGENCY MEDICINE

## 2025-05-01 PROCEDURE — 80053 COMPREHEN METABOLIC PANEL: CPT | Performed by: EMERGENCY MEDICINE

## 2025-05-01 RX ORDER — KETOROLAC TROMETHAMINE 30 MG/ML
30 INJECTION, SOLUTION INTRAMUSCULAR; INTRAVENOUS ONCE
Status: COMPLETED | OUTPATIENT
Start: 2025-05-01 | End: 2025-05-01

## 2025-05-01 RX ORDER — METOCLOPRAMIDE HYDROCHLORIDE 5 MG/ML
10 INJECTION INTRAMUSCULAR; INTRAVENOUS ONCE
Status: COMPLETED | OUTPATIENT
Start: 2025-05-01 | End: 2025-05-01

## 2025-05-01 RX ORDER — DIPHENHYDRAMINE HYDROCHLORIDE 50 MG/ML
25 INJECTION, SOLUTION INTRAMUSCULAR; INTRAVENOUS ONCE
Status: COMPLETED | OUTPATIENT
Start: 2025-05-01 | End: 2025-05-01

## 2025-05-01 RX ORDER — DROPERIDOL 2.5 MG/ML
0.62 INJECTION, SOLUTION INTRAMUSCULAR; INTRAVENOUS ONCE
Status: COMPLETED | OUTPATIENT
Start: 2025-05-01 | End: 2025-05-01

## 2025-05-01 RX ORDER — METHOCARBAMOL 500 MG/1
500 TABLET, FILM COATED ORAL 2 TIMES DAILY
Qty: 20 TABLET | Refills: 0 | Status: SHIPPED | OUTPATIENT
Start: 2025-05-01

## 2025-05-01 RX ORDER — METHOCARBAMOL 500 MG/1
1500 TABLET, FILM COATED ORAL ONCE
Status: COMPLETED | OUTPATIENT
Start: 2025-05-01 | End: 2025-05-01

## 2025-05-01 RX ORDER — MAGNESIUM SULFATE HEPTAHYDRATE 40 MG/ML
2 INJECTION, SOLUTION INTRAVENOUS ONCE
Status: COMPLETED | OUTPATIENT
Start: 2025-05-01 | End: 2025-05-01

## 2025-05-01 RX ORDER — METOCLOPRAMIDE 10 MG/1
10 TABLET ORAL EVERY 6 HOURS
Qty: 30 TABLET | Refills: 0 | Status: SHIPPED | OUTPATIENT
Start: 2025-05-01

## 2025-05-01 RX ORDER — MORPHINE SULFATE 4 MG/ML
4 INJECTION, SOLUTION INTRAMUSCULAR; INTRAVENOUS ONCE
Status: DISCONTINUED | OUTPATIENT
Start: 2025-05-01 | End: 2025-05-01 | Stop reason: HOSPADM

## 2025-05-01 RX ADMIN — DROPERIDOL 0.62 MG: 2.5 INJECTION, SOLUTION INTRAMUSCULAR; INTRAVENOUS at 12:13

## 2025-05-01 RX ADMIN — KETOROLAC TROMETHAMINE 30 MG: 30 INJECTION, SOLUTION INTRAMUSCULAR; INTRAVENOUS at 10:59

## 2025-05-01 RX ADMIN — MAGNESIUM SULFATE HEPTAHYDRATE 2 G: 2 INJECTION, SOLUTION INTRAVENOUS at 11:01

## 2025-05-01 RX ADMIN — SODIUM CHLORIDE 1000 ML: 0.9 INJECTION, SOLUTION INTRAVENOUS at 10:56

## 2025-05-01 RX ADMIN — METOCLOPRAMIDE HYDROCHLORIDE 10 MG: 5 INJECTION INTRAMUSCULAR; INTRAVENOUS at 10:57

## 2025-05-01 RX ADMIN — METHOCARBAMOL 1500 MG: 500 TABLET ORAL at 13:08

## 2025-05-01 RX ADMIN — DIPHENHYDRAMINE HYDROCHLORIDE 25 MG: 50 INJECTION, SOLUTION INTRAMUSCULAR; INTRAVENOUS at 10:54

## 2025-05-01 NOTE — ED PROVIDER NOTES
Time reflects when diagnosis was documented in both MDM as applicable and the Disposition within this note       Time User Action Codes Description Comment    5/1/2025  1:09 PM Kody Love Add [R51.9] Nonintractable headache, unspecified chronicity pattern, unspecified headache type           ED Disposition       ED Disposition   Discharge    Condition   Stable    Date/Time   Thu May 1, 2025  1:09 PM    Comment   Jackelin Kemp discharge to home/self care.                   Assessment & Plan       Medical Decision Making  35-year-old female presents to the emergency department with complaint of headache, differential diagnosis include tension headache, migraine, cervicogenic headache, viral syndrome, medication overuse headache, will perform CT head, neck, provide patient with migraine cocktail, and reassess.    Amount and/or Complexity of Data Reviewed  Labs: ordered.  Radiology: ordered.    Risk  Prescription drug management.        ED Course as of 05/01/25 1311   Thu May 01, 2025   1307 Discussed all results with the patient, she is feeling better after the medications, she will follow-up with her primary care doctor outpatient.  Strict return precautions given.  Patient understands and agrees with treatment plan.       Medications   morphine injection 4 mg (has no administration in time range)   ketorolac (TORADOL) injection 30 mg (30 mg Intravenous Given 5/1/25 1059)   metoclopramide (REGLAN) injection 10 mg (10 mg Intravenous Given 5/1/25 1057)   diphenhydrAMINE (BENADRYL) injection 25 mg (25 mg Intravenous Given 5/1/25 1054)   magnesium sulfate 2 g/50 mL IVPB (premix) 2 g (0 g Intravenous Stopped 5/1/25 1201)   sodium chloride 0.9 % bolus 1,000 mL (0 mL Intravenous Stopped 5/1/25 1156)   droperidol (INAPSINE) injection 0.625 mg (0.625 mg Intravenous Given 5/1/25 1213)   methocarbamol (ROBAXIN) tablet 1,500 mg (1,500 mg Oral Given 5/1/25 1308)       ED Risk Strat Scores                    No data  recorded                            History of Present Illness       Chief Complaint   Patient presents with    Headache     Started yesterday at the base of her skull and neck OTD meds and Ice prior to bed. Today woke up and the pain is worse. Mostly in her neck.  Light sensitivity. [place] N/V.       Past Medical History:   Diagnosis Date    Arthritis     Borderline high cholesterol     Collagen disorder (HCC)     arthritis    CTS (carpal tunnel syndrome)     GERD (gastroesophageal reflux disease)     IBS (irritable bowel syndrome)     Urinary tract infection     Varicella       Past Surgical History:   Procedure Laterality Date    ADENOIDECTOMY      ANAL FISTULOTOMY  2021    COLONOSCOPY      HEMORRHOID SURGERY  10/30/2020    [place]  DELIVERY ONLY N/A 10/4/2022    Procedure:  SECTION ();  Surgeon: Yardlie Toussaint-Foster, DO;  Location: [facility];  Service: Obstetrics    UPPER GASTROINTESTINAL ENDOSCOPY      WISDOM TOOTH EXTRACTION        Family History   Problem Relation Age of Onset    Ulcerative colitis Paternal Grandmother     Diverticulitis Paternal Grandmother     Colon polyps Maternal Aunt     Colon cancer Other     Supraventricular tachycardia Mother     Other Mother         trali antibody    Hyperlipidemia Father     Arthritis Father     Hypertension Father     Hypertension Maternal Grandmother     Heart attack Maternal Grandfather     Colonic polyp Maternal Grandfather     Liver disease Maternal Grandfather     Heart disease Maternal Grandfather     Dementia Paternal Grandfather     No Known Problems Son     Breast cancer Neg Hx     Ovarian cancer Neg Hx       Social History     Tobacco Use    Smoking status: Former     Current packs/day: 1.00     Average packs/day: 1 pack/day for 6.1 years (6.1 ttl pk-yrs)     Types: Cigarettes     Start date: 3/10/2019    Smokeless tobacco: Never   Vaping Use    Vaping status: Never Used   Substance Use Topics    Alcohol use: Yes     Comment:  socially    Drug use: Never      E-Cigarette/Vaping    E-Cigarette Use Never User       E-Cigarette/Vaping Substances    Nicotine No     THC No     CBD No     Flavoring No     Other No     Unknown No       I have reviewed and agree with the history as documented.     35-year-old female with past medical history of arthritis, high cholesterol, GERD, IBS, presents to the emergency department with complaint of headache going on for the past day.  Patient states that she thought that this was initially a severe tension headache, patient has tried ice, and resting, however the symptoms have worsened.  Patient states that she has a tightness, and stinging in the occipital region of her neck.  She denies any vision changes, weakness, numbness, or other strokelike symptoms.  Patient has had no difficulty with ambulating.  Patient states that she has had some chills, she has had no fevers.  She denies any chest pain, shortness of breath, GI or  complaints.      Headache  Associated symptoms: no abdominal pain, no back pain, no cough, no ear pain, no eye pain, no fever, no seizures, no sore throat and no vomiting        Review of Systems   Constitutional:  Negative for chills and fever.   HENT:  Negative for ear pain and sore throat.    Eyes:  Negative for pain and visual disturbance.   Respiratory:  Negative for cough and shortness of breath.    Cardiovascular:  Negative for chest pain and palpitations.   Gastrointestinal:  Negative for abdominal pain and vomiting.   Genitourinary:  Negative for dysuria and hematuria.   Musculoskeletal:  Negative for arthralgias and back pain.   Skin:  Negative for color change and rash.   Neurological:  Positive for headaches. Negative for seizures and syncope.   All other systems reviewed and are negative.          Objective       ED Triage Vitals   Temperature Pulse Blood Pressure Respirations SpO2 Patient Position - Orthostatic VS   05/01/25 1031 05/01/25 1031 05/01/25 1031 05/01/25  1031 05/01/25 1031 05/01/25 1031   97.7 °F (36.5 °C) 55 130/59 18 100 % Lying      Temp Source Heart Rate Source BP Location FiO2 (%) Pain Score    05/01/25 1031 05/01/25 1100 05/01/25 1100 -- 05/01/25 1031    Temporal Monitor Left arm  9      Vitals      Date and Time Temp Pulse SpO2 Resp BP Pain Score FACES Pain Rating User   05/01/25 1300 -- 77 100 % -- -- -- -- MB   05/01/25 1200 -- 76 98 % 16 126/67 -- -- NB   05/01/25 1100 -- 66 100 % 18 124/75 -- -- NB   05/01/25 1059 -- -- -- -- -- 7 -- MB   05/01/25 1046 -- -- -- -- -- 7 -- MB   05/01/25 1031 97.7 °F (36.5 °C) 55 100 % 18 130/59 9 -- SL            Physical Exam  Vitals and nursing note reviewed.   Constitutional:       General: She is not in acute distress.     Appearance: She is well-developed.   HENT:      Head: Normocephalic and atraumatic.   Eyes:      Conjunctiva/sclera: Conjunctivae normal.   Cardiovascular:      Rate and Rhythm: Normal rate and regular rhythm.   Pulmonary:      Effort: Pulmonary effort is normal. No respiratory distress.      Breath sounds: Normal breath sounds.   Abdominal:      Palpations: Abdomen is soft.      Tenderness: There is no abdominal tenderness.   Musculoskeletal:         General: No swelling.      Cervical back: Neck supple.   Skin:     General: Skin is warm and dry.      Capillary Refill: Capillary refill takes less than 2 seconds.   Neurological:      General: No focal deficit present.      Mental Status: She is alert and oriented to person, place, and time. Mental status is at baseline.      Cranial Nerves: No cranial nerve deficit.      Sensory: No sensory deficit.      Motor: No weakness.      Coordination: Coordination normal.   Psychiatric:         Mood and Affect: Mood normal.         Results Reviewed       Procedure Component Value Units Date/Time    hCG, quantitative [[id number]]  (Normal) Collected: 05/01/25 1052    Lab Status: Final result Specimen: Blood from Arm, Right Updated: 05/01/25 1125     HCG, Quant  <0.6 mIU/mL     Narrative:       Expected Ranges:    HCG results between 5.0 and 25.0 mIU/mL may be indicative of early pregnancy but should be interpreted in light of the total clinical presentation.    HCG can rise to detectable levels in leona and post menopausal women (0-11.6 mIU/mL).     Approximate               Approximate HCG  Gestation age          Concentration ( mIU/mL)  _____________          ______________________   Weeks                      HCG values  0.2-1                       5-50  1-2                           2-3                         100-5000  3-4                         500-98094  4-5                         1000-86418  5-6                         [id number]  6-8                         [id number]  8-12                        [id number]      Comprehensive metabolic panel [[id number]] Collected: 05/01/25 1052    Lab Status: Final result Specimen: Blood from Arm, Right Updated: 05/01/25 1118     Sodium 139 mmol/L      Potassium 3.7 mmol/L      Chloride 107 mmol/L      CO2 24 mmol/L      ANION GAP 8 mmol/L      BUN 9 mg/dL      Creatinine 0.82 mg/dL      Glucose 105 mg/dL      Calcium 9.2 mg/dL      AST 13 U/L      ALT 12 U/L      Alkaline Phosphatase 50 U/L      Total Protein 7.0 g/dL      Albumin 4.3 g/dL      Total Bilirubin 0.72 mg/dL      eGFR 93 ml/min/1.73sq m     Narrative:      [employer] guidelines for Chronic Kidney Disease (CKD):     Stage 1 with normal or high GFR (GFR > 90 mL/min/1.73 square meters)    Stage 2 Mild CKD (GFR = 60-89 mL/min/1.73 square meters)    Stage 3A Moderate CKD (GFR = 45-59 mL/min/1.73 square meters)    Stage 3B Moderate CKD (GFR = 30-44 mL/min/1.73 square meters)    Stage 4 Severe CKD (GFR = 15-29 mL/min/1.73 square meters)    Stage 5 End Stage CKD (GFR <15 mL/min/1.73 square meters)  Note: GFR calculation is accurate only with a steady state creatinine    POCT pregnancy, urine [[id number]]  (Normal) Collected: 05/01/25 1116     Lab Status: Final result Specimen: Urine Updated: 25 1116     EXT Preg Test, Ur Negative     Control Valid    CBC and differential [[id number]] Collected: 25 1052    Lab Status: Final result Specimen: Blood from Arm, Right Updated: 25 1057     WBC 8.47 Thousand/uL      RBC 5.01 Million/uL      Hemoglobin 14.4 g/dL      Hematocrit 43.5 %      MCV 87 fL      MCH 28.7 pg      MCHC 33.1 g/dL      RDW 12.8 %      MPV 10.3 fL      Platelets 271 Thousands/uL      nRBC 0 /100 WBCs      Segmented % 69 %      Immature Grans % 0 %      Lymphocytes % 22 %      Monocytes % 6 %      Eosinophils Relative 3 %      Basophils Relative 0 %      Absolute Neutrophils 5.83 Thousands/µL      Absolute Immature Grans 0.02 Thousand/uL      Absolute Lymphocytes 1.86 Thousands/µL      Absolute Monocytes 0.48 Thousand/µL      Eosinophils Absolute 0.25 Thousand/µL      Basophils Absolute 0.03 Thousands/µL             CT head without contrast   Final Interpretation by Herson Hein MD ( 1250)      No acute intracranial abnormality.                  Workstation performed: [id number]         CT spine cervical without contrast   Final Interpretation by Herson Hein MD ([id number])      No cervical spine fracture or traumatic malalignment.                  Workstation performed: [id number]             Procedures    ED Medication and Procedure Management   Prior to Admission Medications   Prescriptions Last Dose Informant Patient Reported? Taking?   buPROPion (WELLBUTRIN SR) 100 mg 12 hr tablet   Yes No   Sig: Take 100 mg by mouth daily   escitalopram (LEXAPRO) 10 mg tablet   Yes No   Sig: TAKE ONE DAILY FOR ANXIETY   fluticasone (FLONASE) 50 mcg/act nasal spray   No No   Si spray into each nostril daily   Patient not taking: Reported on 2025   montelukast (SINGULAIR) 10 mg tablet   Yes No   Sig: Take 10 mg by mouth daily at bedtime   Patient not taking: Reported on 2025   omeprazole (PriLOSEC) 20 mg delayed release  capsule   Yes No   Sig: TAKE 1 CAPSULE BY MOUTH IN THE MORNING. 1 HOUR BEFORE THE FIRST MEAL OF THE DAY.      Facility-Administered Medications: None     Patient's Medications   Discharge Prescriptions    METHOCARBAMOL (ROBAXIN) 500 MG TABLET    Take 1 tablet (500 mg total) by mouth 2 (two) times a day       Start Date: 5/1/2025  End Date: --       Order Dose: 500 mg       Quantity: 20 tablet    Refills: 0     No discharge procedures on file.  ED SEPSIS DOCUMENTATION   Time reflects when diagnosis was documented in both MDM as applicable and the Disposition within this note       Time User Action Codes Description Comment    5/1/2025  1:09 PM Kody Love Add [R51.9] Nonintractable headache, unspecified chronicity pattern, unspecified headache type                  Kody Love DO  05/01/25 2848

## 2025-05-01 NOTE — DISCHARGE INSTRUCTIONS
You were seen in the emergency department for headache, your blood work, and CT scan results were unremarkable, please follow-up with your primary care doctor outpatient for further evaluation.  We have provided you with a prescription for muscle relaxers, please pick them up at the pharmacy and take them as directed.  If your symptoms worsen or persist, please return to the emergency department immediately.

## 2025-07-28 ENCOUNTER — HOSPITAL ENCOUNTER (EMERGENCY)
Facility: HOSPITAL | Age: 35
Discharge: HOME | End: 2025-07-28
Attending: EMERGENCY MEDICINE | Admitting: EMERGENCY MEDICINE
Payer: COMMERCIAL

## 2025-07-28 VITALS
TEMPERATURE: 97.7 F | RESPIRATION RATE: 18 BRPM | DIASTOLIC BLOOD PRESSURE: 101 MMHG | SYSTOLIC BLOOD PRESSURE: 125 MMHG | HEART RATE: 86 BPM | OXYGEN SATURATION: 98 %

## 2025-07-28 DIAGNOSIS — T30.0 BURN: Primary | ICD-10-CM

## 2025-07-28 PROCEDURE — 99283 EMERGENCY DEPT VISIT LOW MDM: CPT

## 2025-07-28 PROCEDURE — 99284 EMERGENCY DEPT VISIT MOD MDM: CPT | Performed by: EMERGENCY MEDICINE

## 2025-07-28 RX ORDER — GINSENG 100 MG
1 CAPSULE ORAL ONCE
Status: COMPLETED | OUTPATIENT
Start: 2025-07-28 | End: 2025-07-28

## 2025-07-28 RX ADMIN — BACITRACIN 1 LARGE APPLICATION: 500 OINTMENT TOPICAL at 15:31
